# Patient Record
Sex: FEMALE | Race: WHITE | Employment: OTHER | URBAN - METROPOLITAN AREA
[De-identification: names, ages, dates, MRNs, and addresses within clinical notes are randomized per-mention and may not be internally consistent; named-entity substitution may affect disease eponyms.]

---

## 2018-02-20 ENCOUNTER — APPOINTMENT (OUTPATIENT)
Dept: CT IMAGING | Facility: CLINIC | Age: 68
DRG: 394 | End: 2018-02-20
Attending: NURSE PRACTITIONER
Payer: MEDICARE

## 2018-02-20 ENCOUNTER — HOSPITAL ENCOUNTER (INPATIENT)
Facility: CLINIC | Age: 68
LOS: 8 days | Discharge: HOME | DRG: 394 | End: 2018-02-28
Attending: NURSE PRACTITIONER | Admitting: INTERNAL MEDICINE
Payer: MEDICARE

## 2018-02-20 DIAGNOSIS — K92.2 GASTROINTESTINAL HEMORRHAGE, UNSPECIFIED GASTROINTESTINAL HEMORRHAGE TYPE: ICD-10-CM

## 2018-02-20 DIAGNOSIS — A41.9 SEPTIC SHOCK (H): ICD-10-CM

## 2018-02-20 DIAGNOSIS — R65.21 SEPTIC SHOCK (H): ICD-10-CM

## 2018-02-20 DIAGNOSIS — R19.7 DIARRHEA, UNSPECIFIED TYPE: ICD-10-CM

## 2018-02-20 LAB
ALBUMIN SERPL-MCNC: 3.4 G/DL (ref 3.4–5)
ALBUMIN UR-MCNC: NEGATIVE MG/DL
ALP SERPL-CCNC: 64 U/L (ref 40–150)
ALT SERPL W P-5'-P-CCNC: 19 U/L (ref 0–50)
ANION GAP SERPL CALCULATED.3IONS-SCNC: 11 MMOL/L (ref 3–14)
APPEARANCE UR: CLEAR
AST SERPL W P-5'-P-CCNC: 13 U/L (ref 0–45)
BILIRUB SERPL-MCNC: 0.4 MG/DL (ref 0.2–1.3)
BILIRUB UR QL STRIP: NEGATIVE
BUN SERPL-MCNC: 56 MG/DL (ref 7–30)
CALCIUM SERPL-MCNC: 9.3 MG/DL (ref 8.5–10.1)
CHLORIDE SERPL-SCNC: 109 MMOL/L (ref 94–109)
CO2 SERPL-SCNC: 20 MMOL/L (ref 20–32)
COLOR UR AUTO: YELLOW
CREAT SERPL-MCNC: 0.78 MG/DL (ref 0.52–1.04)
ERYTHROCYTE [DISTWIDTH] IN BLOOD BY AUTOMATED COUNT: 19.6 % (ref 10–15)
GFR SERPL CREATININE-BSD FRML MDRD: 73 ML/MIN/1.7M2
GLUCOSE SERPL-MCNC: 206 MG/DL (ref 70–99)
GLUCOSE UR STRIP-MCNC: NEGATIVE MG/DL
HCT VFR BLD AUTO: 38 % (ref 35–47)
HEMOCCULT STL QL: POSITIVE
HGB BLD-MCNC: 11.3 G/DL (ref 11.7–15.7)
HGB BLD-MCNC: 8.7 G/DL (ref 11.7–15.7)
HGB UR QL STRIP: NEGATIVE
KETONES UR STRIP-MCNC: NEGATIVE MG/DL
LACTATE SERPL-SCNC: 2 MMOL/L (ref 0.4–2)
LACTATE SERPL-SCNC: 4.2 MMOL/L (ref 0.4–2)
LEUKOCYTE ESTERASE UR QL STRIP: NEGATIVE
MCH RBC QN AUTO: 25.2 PG (ref 26.5–33)
MCHC RBC AUTO-ENTMCNC: 29.7 G/DL (ref 31.5–36.5)
MCV RBC AUTO: 85 FL (ref 78–100)
NITRATE UR QL: NEGATIVE
PH UR STRIP: 5 PH (ref 5–7)
PLATELET # BLD AUTO: 280 10E9/L (ref 150–450)
POTASSIUM SERPL-SCNC: 4.9 MMOL/L (ref 3.4–5.3)
PROT SERPL-MCNC: 7.3 G/DL (ref 6.8–8.8)
RBC # BLD AUTO: 4.48 10E12/L (ref 3.8–5.2)
SODIUM SERPL-SCNC: 140 MMOL/L (ref 133–144)
SOURCE: NORMAL
SP GR UR STRIP: 1.02 (ref 1–1.03)
UROBILINOGEN UR STRIP-MCNC: 0 MG/DL (ref 0–2)
WBC # BLD AUTO: 20 10E9/L (ref 4–11)

## 2018-02-20 PROCEDURE — 82272 OCCULT BLD FECES 1-3 TESTS: CPT | Performed by: NURSE PRACTITIONER

## 2018-02-20 PROCEDURE — 25000128 H RX IP 250 OP 636: Performed by: NURSE PRACTITIONER

## 2018-02-20 PROCEDURE — 99207 ZZC CDG-HISTORY COMP: MEETS EXP. PROB FOCUSED- DOWN CODED LACK OF PFSH: CPT | Performed by: INTERNAL MEDICINE

## 2018-02-20 PROCEDURE — 51702 INSERT TEMP BLADDER CATH: CPT

## 2018-02-20 PROCEDURE — 83605 ASSAY OF LACTIC ACID: CPT | Performed by: NURSE PRACTITIONER

## 2018-02-20 PROCEDURE — 25000125 ZZHC RX 250: Performed by: INTERNAL MEDICINE

## 2018-02-20 PROCEDURE — 12000000 ZZH R&B MED SURG/OB

## 2018-02-20 PROCEDURE — 96375 TX/PRO/DX INJ NEW DRUG ADDON: CPT

## 2018-02-20 PROCEDURE — 96365 THER/PROPH/DIAG IV INF INIT: CPT

## 2018-02-20 PROCEDURE — 36415 COLL VENOUS BLD VENIPUNCTURE: CPT | Performed by: NURSE PRACTITIONER

## 2018-02-20 PROCEDURE — 85027 COMPLETE CBC AUTOMATED: CPT | Performed by: NURSE PRACTITIONER

## 2018-02-20 PROCEDURE — 85018 HEMOGLOBIN: CPT | Performed by: INTERNAL MEDICINE

## 2018-02-20 PROCEDURE — 36415 COLL VENOUS BLD VENIPUNCTURE: CPT | Performed by: INTERNAL MEDICINE

## 2018-02-20 PROCEDURE — 87040 BLOOD CULTURE FOR BACTERIA: CPT | Performed by: NURSE PRACTITIONER

## 2018-02-20 PROCEDURE — 80053 COMPREHEN METABOLIC PANEL: CPT | Performed by: NURSE PRACTITIONER

## 2018-02-20 PROCEDURE — 96376 TX/PRO/DX INJ SAME DRUG ADON: CPT

## 2018-02-20 PROCEDURE — A9270 NON-COVERED ITEM OR SERVICE: HCPCS | Mod: GY | Performed by: INTERNAL MEDICINE

## 2018-02-20 PROCEDURE — 99221 1ST HOSP IP/OBS SF/LOW 40: CPT | Mod: AI | Performed by: INTERNAL MEDICINE

## 2018-02-20 PROCEDURE — 25000128 H RX IP 250 OP 636

## 2018-02-20 PROCEDURE — 25000132 ZZH RX MED GY IP 250 OP 250 PS 637: Mod: GY | Performed by: INTERNAL MEDICINE

## 2018-02-20 PROCEDURE — 74177 CT ABD & PELVIS W/CONTRAST: CPT

## 2018-02-20 PROCEDURE — 99285 EMERGENCY DEPT VISIT HI MDM: CPT | Mod: 25

## 2018-02-20 PROCEDURE — 96361 HYDRATE IV INFUSION ADD-ON: CPT

## 2018-02-20 PROCEDURE — 81003 URINALYSIS AUTO W/O SCOPE: CPT | Performed by: NURSE PRACTITIONER

## 2018-02-20 PROCEDURE — 25000128 H RX IP 250 OP 636: Performed by: INTERNAL MEDICINE

## 2018-02-20 RX ORDER — PROCHLORPERAZINE MALEATE 5 MG
5 TABLET ORAL EVERY 6 HOURS PRN
Status: DISCONTINUED | OUTPATIENT
Start: 2018-02-20 | End: 2018-02-28 | Stop reason: HOSPADM

## 2018-02-20 RX ORDER — SODIUM CHLORIDE 9 MG/ML
INJECTION, SOLUTION INTRAVENOUS CONTINUOUS
Status: DISCONTINUED | OUTPATIENT
Start: 2018-02-20 | End: 2018-02-25

## 2018-02-20 RX ORDER — CEFTRIAXONE SODIUM 1 G/50ML
1 INJECTION, SOLUTION INTRAVENOUS EVERY 24 HOURS
Status: DISCONTINUED | OUTPATIENT
Start: 2018-02-21 | End: 2018-02-21

## 2018-02-20 RX ORDER — LEVOTHYROXINE SODIUM 100 UG/1
100 TABLET ORAL DAILY
Status: DISCONTINUED | OUTPATIENT
Start: 2018-02-20 | End: 2018-02-28 | Stop reason: HOSPADM

## 2018-02-20 RX ORDER — SIMVASTATIN 40 MG
40 TABLET ORAL AT BEDTIME
Status: DISCONTINUED | OUTPATIENT
Start: 2018-02-20 | End: 2018-02-28 | Stop reason: HOSPADM

## 2018-02-20 RX ORDER — ANASTROZOLE 1 MG/1
1 TABLET ORAL DAILY
Status: DISCONTINUED | OUTPATIENT
Start: 2018-02-20 | End: 2018-02-28 | Stop reason: HOSPADM

## 2018-02-20 RX ORDER — CHOLECALCIFEROL (VITAMIN D3) 25 MCG
2000 CAPSULE ORAL DAILY
COMMUNITY

## 2018-02-20 RX ORDER — CITALOPRAM HYDROBROMIDE 20 MG/1
20 TABLET ORAL DAILY
Status: DISCONTINUED | OUTPATIENT
Start: 2018-02-20 | End: 2018-02-28 | Stop reason: HOSPADM

## 2018-02-20 RX ORDER — IOPAMIDOL 755 MG/ML
500 INJECTION, SOLUTION INTRAVASCULAR ONCE
Status: COMPLETED | OUTPATIENT
Start: 2018-02-20 | End: 2018-02-20

## 2018-02-20 RX ORDER — ONDANSETRON 2 MG/ML
4 INJECTION INTRAMUSCULAR; INTRAVENOUS EVERY 30 MIN PRN
Status: DISCONTINUED | OUTPATIENT
Start: 2018-02-20 | End: 2018-02-21

## 2018-02-20 RX ORDER — ONDANSETRON 2 MG/ML
4 INJECTION INTRAMUSCULAR; INTRAVENOUS EVERY 6 HOURS PRN
Status: DISCONTINUED | OUTPATIENT
Start: 2018-02-20 | End: 2018-02-28 | Stop reason: HOSPADM

## 2018-02-20 RX ORDER — NALOXONE HYDROCHLORIDE 0.4 MG/ML
.1-.4 INJECTION, SOLUTION INTRAMUSCULAR; INTRAVENOUS; SUBCUTANEOUS
Status: DISCONTINUED | OUTPATIENT
Start: 2018-02-20 | End: 2018-02-28 | Stop reason: HOSPADM

## 2018-02-20 RX ORDER — ONDANSETRON 4 MG/1
4 TABLET, ORALLY DISINTEGRATING ORAL EVERY 6 HOURS PRN
Status: DISCONTINUED | OUTPATIENT
Start: 2018-02-20 | End: 2018-02-28 | Stop reason: HOSPADM

## 2018-02-20 RX ORDER — CEFTRIAXONE SODIUM 1 G/50ML
1 INJECTION, SOLUTION INTRAVENOUS ONCE
Status: COMPLETED | OUTPATIENT
Start: 2018-02-20 | End: 2018-02-20

## 2018-02-20 RX ORDER — ANASTROZOLE 1 MG/1
1 TABLET ORAL DAILY
COMMUNITY

## 2018-02-20 RX ORDER — POTASSIUM CHLORIDE 1500 MG/1
20-40 TABLET, EXTENDED RELEASE ORAL
Status: DISCONTINUED | OUTPATIENT
Start: 2018-02-20 | End: 2018-02-28 | Stop reason: HOSPADM

## 2018-02-20 RX ORDER — ONDANSETRON 2 MG/ML
INJECTION INTRAMUSCULAR; INTRAVENOUS
Status: COMPLETED
Start: 2018-02-20 | End: 2018-02-20

## 2018-02-20 RX ORDER — PROCHLORPERAZINE 25 MG
12.5 SUPPOSITORY, RECTAL RECTAL EVERY 12 HOURS PRN
Status: DISCONTINUED | OUTPATIENT
Start: 2018-02-20 | End: 2018-02-28 | Stop reason: HOSPADM

## 2018-02-20 RX ORDER — POTASSIUM CHLORIDE 1.5 G/1.58G
20-40 POWDER, FOR SOLUTION ORAL
Status: DISCONTINUED | OUTPATIENT
Start: 2018-02-20 | End: 2018-02-28 | Stop reason: HOSPADM

## 2018-02-20 RX ORDER — ACETAMINOPHEN 650 MG/1
650 SUPPOSITORY RECTAL EVERY 4 HOURS PRN
Status: DISCONTINUED | OUTPATIENT
Start: 2018-02-20 | End: 2018-02-23

## 2018-02-20 RX ORDER — ACETAMINOPHEN 325 MG/1
650 TABLET ORAL EVERY 4 HOURS PRN
Status: DISCONTINUED | OUTPATIENT
Start: 2018-02-20 | End: 2018-02-28 | Stop reason: HOSPADM

## 2018-02-20 RX ORDER — CHLORAL HYDRATE 500 MG
1 CAPSULE ORAL DAILY
COMMUNITY

## 2018-02-20 RX ORDER — POTASSIUM CHLORIDE 7.45 MG/ML
10 INJECTION INTRAVENOUS
Status: DISCONTINUED | OUTPATIENT
Start: 2018-02-20 | End: 2018-02-28 | Stop reason: HOSPADM

## 2018-02-20 RX ORDER — POTASSIUM CHLORIDE 29.8 MG/ML
20 INJECTION INTRAVENOUS
Status: DISCONTINUED | OUTPATIENT
Start: 2018-02-20 | End: 2018-02-28 | Stop reason: HOSPADM

## 2018-02-20 RX ORDER — POTASSIUM CL/LIDO/0.9 % NACL 10MEQ/0.1L
10 INTRAVENOUS SOLUTION, PIGGYBACK (ML) INTRAVENOUS
Status: DISCONTINUED | OUTPATIENT
Start: 2018-02-20 | End: 2018-02-28 | Stop reason: HOSPADM

## 2018-02-20 RX ADMIN — LEVOTHYROXINE SODIUM 100 MCG: 100 TABLET ORAL at 18:16

## 2018-02-20 RX ADMIN — SIMVASTATIN 40 MG: 40 TABLET, FILM COATED ORAL at 21:05

## 2018-02-20 RX ADMIN — SODIUM CHLORIDE 1000 ML: 9 INJECTION, SOLUTION INTRAVENOUS at 11:16

## 2018-02-20 RX ADMIN — IOPAMIDOL 100 ML: 755 INJECTION, SOLUTION INTRAVENOUS at 14:21

## 2018-02-20 RX ADMIN — CITALOPRAM HYDROBROMIDE 20 MG: 20 TABLET ORAL at 18:15

## 2018-02-20 RX ADMIN — SODIUM CHLORIDE 1000 ML: 9 INJECTION, SOLUTION INTRAVENOUS at 14:21

## 2018-02-20 RX ADMIN — SODIUM CHLORIDE: 9 INJECTION, SOLUTION INTRAVENOUS at 17:47

## 2018-02-20 RX ADMIN — CEFTRIAXONE SODIUM 1 G: 1 INJECTION, SOLUTION INTRAVENOUS at 12:19

## 2018-02-20 RX ADMIN — ONDANSETRON 4 MG: 2 INJECTION INTRAMUSCULAR; INTRAVENOUS at 12:18

## 2018-02-20 RX ADMIN — CEFTRIAXONE SODIUM 1 G: 1 INJECTION, SOLUTION INTRAVENOUS at 15:55

## 2018-02-20 RX ADMIN — ONDANSETRON 4 MG: 4 TABLET, ORALLY DISINTEGRATING ORAL at 21:11

## 2018-02-20 RX ADMIN — PANTOPRAZOLE SODIUM 40 MG: 40 INJECTION, POWDER, FOR SOLUTION INTRAVENOUS at 18:12

## 2018-02-20 RX ADMIN — ACETAMINOPHEN 650 MG: 325 TABLET, FILM COATED ORAL at 18:16

## 2018-02-20 RX ADMIN — METRONIDAZOLE 500 MG: 500 INJECTION, SOLUTION INTRAVENOUS at 18:18

## 2018-02-20 RX ADMIN — ANASTROZOLE 1 MG: 1 TABLET, COATED ORAL at 18:16

## 2018-02-20 RX ADMIN — SODIUM CHLORIDE, POTASSIUM CHLORIDE, SODIUM LACTATE AND CALCIUM CHLORIDE 2000 ML: 600; 310; 30; 20 INJECTION, SOLUTION INTRAVENOUS at 11:56

## 2018-02-20 RX ADMIN — ONDANSETRON 4 MG: 2 INJECTION INTRAMUSCULAR; INTRAVENOUS at 16:54

## 2018-02-20 ASSESSMENT — ENCOUNTER SYMPTOMS
VOMITING: 1
NAUSEA: 1
DIFFICULTY URINATING: 0
DIZZINESS: 1
APPETITE CHANGE: 0
DYSURIA: 0
WOUND: 0
DIAPHORESIS: 1
CHILLS: 0
FATIGUE: 0
HEADACHES: 0
FEVER: 0
DIARRHEA: 1
HEMATURIA: 0
COUGH: 0
SHORTNESS OF BREATH: 0
ABDOMINAL PAIN: 0
LIGHT-HEADEDNESS: 0

## 2018-02-20 ASSESSMENT — ACTIVITIES OF DAILY LIVING (ADL): ADLS_ACUITY_SCORE: 11

## 2018-02-20 ASSESSMENT — PAIN DESCRIPTION - DESCRIPTORS: DESCRIPTORS: CONSTANT

## 2018-02-20 NOTE — ED NOTES
Bed: ED28  Expected date: 2/20/18  Expected time: 10:39 AM  Means of arrival: Ambulance  Comments:  HE  66yo Nausea/vomiting

## 2018-02-20 NOTE — H&P
[facility]    History and Physical  Hospitalist       Date of Admission:  2/20/2018  Date of Service (when I saw the patient): 02/20/18    Assessment & Plan   Michelle Pedersen is a 67 year old female who presents with 1-2 days of diarrhea.  Stools turned black earlier today.  Patient dehydrated upon presentation with elevated lactate has improved with IV fluid resuscitation.  There is no evidence for infection however CT scan did suggest possible mild inflammatory change of the transverse and upper descending colon.      1.  Suspect upper gastrointestinal bleeding with presentation of melena:    Will admit to inpatient for closer observation and monitoring    Start Protonix 40 mg IV q.12 hours    IV fluids    GI consultation for consideration of EGD.    Serial hemoglobins every 6 hours.  Conditional transfusion 1 unit for hemoglobin 7 or less.    Continue Flagyl and Rocephin empirically while stool studies are pending.    2.  Hypothyroidism    Resume Synthroid    3.  History of breast cancer    Resume Arimidex    4.  Depression    Resume antidepressant medication.    DVT Prophylaxis: Pneumatic Compression Devices  Code Status: Full Code    Disposition: Expected discharge in TBD    Darrion Moore MD    Primary Care Physician   Vidya Serna    Chief Complaint   1-2 days of diarrhea.    History is obtained from the patient, electronic health record and emergency department physician    History of Present Illness   Michlele Pedersen is a 67 year old female with a history of breast cancer depression hyperlipidemia and hypertension as well as a GI bleed due to duodenal ulcer who now presents with diarrhea.  Patient states her symptoms began yesterday with liquid watery stools.  She noticed that today the stools began to become dark and then black.  Very lightheaded and woozy but did not lose consciousness or pass out.  She has had no shortness of breath fevers sweats chills cough.  She's had no vomiting of blood  or coffee-ground material.  She denies any abdominal pain.  She's had some nausea but no vomiting.  She had a history of duodenal ulcer with a GI bleed with blood transfusion.  This was back in 2011.  At that time it was thought secondary to taking Aleve.  She is no longer taking Aleve or any other nonsteroidal anti-inflammatories including aspirin.    She is not taking a PPI.  She currently feels better after receiving IV fluids.  Of note patient had transient hypotension with systolic pressure in the 80s in the field but henceforth blood pressures have been maintained at 120 systolic or greater.    Past Medical History    I have reviewed this patient's medical history and updated it with pertinent information if needed.   Past Medical History:   Diagnosis Date     Breast cancer (H)      Depression      High cholesterol      Hypothyroid        Past Surgical History   I have reviewed this patient's surgical history and updated it with pertinent information if needed.  Past Surgical History:   Procedure Laterality Date     ESOPHAGOSCOPY, GASTROSCOPY, DUODENOSCOPY (EGD), COMBINED  10/27/2011    Procedure:COMBINED ESOPHAGOSCOPY, GASTROSCOPY, DUODENOSCOPY (EGD), BIOPSY SINGLE OR MULTIPLE; Esophagoscopy, Gastroscopy, Duodenoscopy cold biopsy ; Surgeon:NADIA MORALES; Location: GI       Prior to Admission Medications   Prior to Admission Medications   Prescriptions Last Dose Informant Patient Reported? Taking?   Cholecalciferol (VITAMIN D-3) 1000 UNITS CAPS 2/19/2018 at am  Yes Yes   Sig: Take 2,000 Units by mouth daily   Glucosamine-Chondroitin 250-200 MG TABS 2/19/2018 at am  Yes Yes   Sig: Take 1 tablet by mouth daily   anastrozole (ARIMIDEX) 1 MG tablet 2/19/2018 at am  Yes Yes   Sig: Take 1 mg by mouth daily   calcium-vitamin D (CALTRATE) 600-400 MG-UNIT per tablet 2/19/2018 at am  Yes Yes   Sig: Take 1 tablet by mouth daily   citalopram (CELEXA) 20 MG tablet 2/19/2018 at am  Yes Yes   Sig: Take 20 mg by mouth  daily.   fish oil-omega-3 fatty acids 1000 MG capsule 2/19/2018 at am  Yes Yes   Sig: Take 1 g by mouth daily   levothyroxine (SSYNTHROID,LEVOTHROID) 100 MCG tablet 2/19/2018 at am  Yes Yes   Sig: Take 100 mcg by mouth daily.   melatonin 5 MG tablet   Yes Yes   Sig: Take 5 mg by mouth nightly as needed for sleep   simvastatin (ZOCOR) 20 MG tablet 2/19/2018 at Unknown time  Yes Yes   Sig: Take 40 mg by mouth At Bedtime       Facility-Administered Medications: None     Allergies   Allergies   Allergen Reactions     Penicillins Itching and Rash     Tolerated ceftriaxone         Social History   I have reviewed this patient's social history and updated it with pertinent information if needed. Michelle Pedersen  reports that she has never smoked. She has never used smokeless tobacco. She reports that she drinks alcohol. She reports that she does not use illicit drugs.    Family History   I have reviewed this patient's family history and updated it with pertinent information if needed.   No family history on file.    Review of Systems   The 10 point Review of Systems is negative other than noted in the HPI or here.      Physical Exam   Temp: 99.2  F (37.3  C) Temp src: Oral BP: 130/60 Pulse: 114 Heart Rate: 115 Resp: 18 SpO2: 98 % O2 Device: None (Room air)    Vital Signs with Ranges  Temp:  [97.6  F (36.4  C)-99.2  F (37.3  C)] 99.2  F (37.3  C)  Pulse:  [103-115] 114  Heart Rate:  [115] 115  Resp:  [18-20] 18  BP: (115-211)/() 130/60  SpO2:  [93 %-100 %] 98 %  220 lbs 0 oz    Constitutional: Awake, alert, cooperative, no apparent distress.  Eyes: Conjunctiva and pupils examined and normal.  HEENT: Tacky mucous membranes, normal dentition.  Respiratory: Clear to auscultation bilaterally, no crackles or wheezing.  Cardiovascular: Tachycardic in the 110s with regular rhythm, normal S1 and S2, and no murmur noted.  GI: Soft, non-distended, non-tender, normal bowel sounds.  Skin: No rashes, no cyanosis, no  edema.  Musculoskeletal: No joint swelling, erythema or tenderness.  Neurologic: Cranial nerves 2-12 intact, normal strength and sensation.  Psychiatric: Alert, oriented to person, place and time, no obvious anxiety or depression.    Data   Data reviewed today:  I personally reviewed no images or EKG's today.    Recent Labs  Lab 02/20/18  1116   WBC 20.0*   HGB 11.3*   MCV 85         POTASSIUM 4.9   CHLORIDE 109   CO2 20   BUN 56*   CR 0.78   ANIONGAP 11   VIRI 9.3   *   ALBUMIN 3.4   PROTTOTAL 7.3   BILITOTAL 0.4   ALKPHOS 64   ALT 19   AST 13       Imaging:  Recent Results (from the past 24 hour(s))   CT Abdomen Pelvis w Contrast    Narrative    CT ABDOMEN/PELVIS WITH CONTRAST February 20, 2018 2:28 PM     HISTORY: GI bleed, elevated WBC.    TECHNIQUE: Volumetric acquisition through abdomen and pelvis with  IV  contrast.  100mL Isovue-370. Radiation dose for this scan was reduced  using automated exposure control, adjustment of the mA and/or kV  according to patient size, or iterative reconstruction technique.    COMPARISON: 10/26/2011.    FINDINGS: Coronary artery calcifications. The liver, gallbladder,  spleen, pancreas, adrenal glands and kidneys demonstrate no worrisome  findings. There are a few small and borderline prominent upper  abdominal lymph nodes and a left retroperitoneal lymph node near the  adrenal gland which are stable. Mild vascular calcification. No  abdominal aortic aneurysm.    Mild wall thickening of the transverse and upper descending colon  suggesting colitis. There was similar mild wall thickening involving  the colon on the previous exam. No pneumatosis, bowel obstruction or  free air.    Uterus is present. No suspicious pelvic masses. Yañez catheter in the  bladder.      Impression    IMPRESSION: Mild nonspecific left-sided colitis.       AKASH HERNANDEZ MD

## 2018-02-20 NOTE — IP AVS SNAPSHOT
[place] [address] [facility]    [address]    [facility] [address]    Phone:  [phone]    Fax:  [phone]                                       After Visit Summary   2/20/2018    Michelle Pedersen    MRN: [id number]           After Visit Summary Signature Page     I have received my discharge instructions, and my questions have been answered. I have discussed any challenges I see with this plan with the nurse or doctor.    ..........................................................................................................................................  Patient/Patient Representative Signature      ..........................................................................................................................................  Patient Representative Print Name and Relationship to Patient    ..................................................               ................................................  Date                                            Time    ..........................................................................................................................................  Reviewed by Signature/Title    ...................................................              ..............................................  Date                                                            Time

## 2018-02-20 NOTE — IP AVS SNAPSHOT
MRN:[id number]                      After Visit Summary   2/20/2018    Michelle Pedersen    MRN: [id number]           Thank you!     Thank you for choosing [facility] for your care. Our goal is always to provide you with excellent care. Hearing back from our patients is one way we can continue to improve our services. Please take a few minutes to complete the written survey that you may receive in the mail after you visit. If you would like to speak to someone directly about your visit please contact Patient Relations at [phone]. Thank you!          Patient Information     Date Of Birth          1950        Designated Caregiver       Most Recent Value    Caregiver    Will someone help with your care after discharge? no      About your hospital stay     You were admitted on:  February 20, 2018 You last received care in the:  [place] [address] [facility]    You were discharged on:  February 28, 2018       Who to Call     For medical emergencies, please call 911.  For non-urgent questions about your medical care, please call your primary care provider or clinic, [phone]  For questions related to your surgery, please call your surgery clinic        Attending Provider     Provider Specialty    Max De Los Santos APRN [facility]    Darrion Moore MD Internal Medicine       Primary Care Provider Office Phone # Fax #    Vidya Serna [phone]      After Care Instructions     Activity       Your activity upon discharge: activity as tolerated            Diet       Follow this diet upon discharge: Regular                  Follow-up Appointments     Follow-up and recommended labs and tests        Follow up with primary care provider, Vidya Serna, within 7 days for hospital follow- up.  The following labs/tests are recommended: CBC and BMP in 7 days.                  Pending Results     No orders found from 2/18/2018 to 2/21/2018.           "  Statement of Approval     Ordered          18 1149  I have reviewed and agree with all the recommendations and orders detailed in this document.  EFFECTIVE NOW     Approved and electronically signed by:  Sam Palomares DO             Admission Information     Date & Time Department Dept. Phone    [address] [place] [address] [facility] [phone]      Your Vitals Were     Blood Pressure Pulse Temperature Respirations Weight Pulse Oximetry    116/53 (BP Location: Left arm) 100 98.6  F (37  C) (Oral) 20 111.2 kg (245 lb 1.6 oz) 94%    BMI (Body Mass Index)                   44.83 kg/m2           MyChart Information     [employer] lets you send messages to your doctor, view your test results, renew your prescriptions, schedule appointments and more. To sign up, go to [email or web address] . Click on \"Log in\" on the left side of the screen, which will take you to the Welcome page. Then click on \"Sign up Now\" on the right side of the page.     You will be asked to enter the access code listed below, as well as some personal information. Please follow the directions to create your username and password.     Your access code is: [email or web address]  Expires: 2018 12:19 PM     Your access code will  in 90 days. If you need help or a new code, please call your [place] clinic or [phone].        Care EveryWhere ID     This is your Care EveryWhere ID. This could be used by other organizations to access your [place] medical records  [id number]        Equal Access to Services     [facility] LEXX : Hadii aad ku hadasho Soomaali, waaxda luqadaha, qaybta kaalmada adeegyada, eric ayala . So [facility] [phone].    ATENCIÓN: Si habla español, tiene a ascencio disposición servicios gratuitos de asistencia lingüística. Llame al [phone].    We comply with applicable federal civil rights laws and [place] laws. We do not discriminate on the basis of race, color, national origin, " age, disability, sex, sexual orientation, or gender identity.               Review of your medicines      START taking        Dose / Directions    pantoprazole 40 MG EC tablet   Commonly known as:  PROTONIX   Used for:  Gastrointestinal hemorrhage, unspecified gastrointestinal hemorrhage type        Dose:  40 mg   Take 1 tablet (40 mg) by mouth daily   Quantity:  30 tablet   Refills:  0         CONTINUE these medicines which have NOT CHANGED        Dose / Directions    anastrozole 1 MG tablet   Commonly known as:  ARIMIDEX        Dose:  1 mg   Take 1 mg by mouth daily   Refills:  0       calcium-vitamin D 600-400 MG-UNIT per tablet   Commonly known as:  CALTRATE        Dose:  1 tablet   Take 1 tablet by mouth daily   Refills:  0       citalopram 20 MG tablet   Commonly known as:  celeXA        Dose:  20 mg   Take 20 mg by mouth daily.   Refills:  0       fish oil-omega-3 fatty acids 1000 MG capsule        Dose:  1 g   Take 1 g by mouth daily   Refills:  0       Glucosamine-Chondroitin 250-200 MG Tabs        Dose:  1 tablet   Take 1 tablet by mouth daily   Refills:  0       levothyroxine 100 MCG tablet   Commonly known as:  SYNTHROID/LEVOTHROID        Dose:  100 mcg   Take 100 mcg by mouth daily.   Refills:  0       melatonin 5 MG tablet        Dose:  5 mg   Take 5 mg by mouth nightly as needed for sleep   Refills:  0       simvastatin 20 MG tablet   Commonly known as:  ZOCOR        Dose:  40 mg   Take 40 mg by mouth At Bedtime   Refills:  0       Vitamin D-3 1000 UNITS Caps        Dose:  2000 Units   Take 2,000 Units by mouth daily   Refills:  0            Where to get your medicines      These medications were sent to [place] [address] [facility] - [place], [place] - [address]  [address] [address]     Phone:  [phone]     pantoprazole 40 MG EC tablet                Protect others around you: Learn how to safely use, store and throw away your medicines at www.disposemymeds.org.             Medication  List: This is a list of all your medications and when to take them. Check marks below indicate your daily home schedule. Keep this list as a reference.      Medications           Morning Afternoon Evening Bedtime As Needed    anastrozole 1 MG tablet   Commonly known as:  ARIMIDEX   Take 1 mg by mouth daily   Last time this was given:  1 mg on 2/28/2018  8:12 AM                                   calcium-vitamin D 600-400 MG-UNIT per tablet   Commonly known as:  CALTRATE   Take 1 tablet by mouth daily                                   citalopram 20 MG tablet   Commonly known as:  celeXA   Take 20 mg by mouth daily.   Last time this was given:  20 mg on 2/28/2018  8:13 AM                                   fish oil-omega-3 fatty acids 1000 MG capsule   Take 1 g by mouth daily                                   Glucosamine-Chondroitin 250-200 MG Tabs   Take 1 tablet by mouth daily                                   levothyroxine 100 MCG tablet   Commonly known as:  SYNTHROID/LEVOTHROID   Take 100 mcg by mouth daily.   Last time this was given:  100 mcg on 2/28/2018  8:12 AM                                   melatonin 5 MG tablet   Take 5 mg by mouth nightly as needed for sleep                        As Needed           pantoprazole 40 MG EC tablet   Commonly known as:  PROTONIX   Take 1 tablet (40 mg) by mouth daily   Last time this was given:  40 mg on 2/28/2018  6:52 AM                                   simvastatin 20 MG tablet   Commonly known as:  ZOCOR   Take 40 mg by mouth At Bedtime   Last time this was given:  40 mg on 2/27/2018  9:14 PM                                   Vitamin D-3 1000 UNITS Caps   Take 2,000 Units by mouth daily                                             More Information        Ischemic Colitis     Ischemic colitis happens if blood flow to a part of the colon is reduced.   Ischemic colitis happens when blood flow to the colon is reduced or blocked. Bloody diarrhea and severe belly pain are the  most common symptoms. Other symptoms include vomiting, fever, and fainting. Diarrhea can lead to severe dehydration. This is the rapid loss of the fluids your body needs to function. Because of the severe pain and the risk for dehydration, ischemic colitis should be treated right away.  Causes of ischemic colitis  The cause of the reduction or blockage of blood flow to the colon is not well understood. In some cases, a sudden drop in blood pressure, or dehydration, leads to an episode. Ischemic colitis is more likely in people with blood clotting problems or heart and blood vessel disease.  Diagnosing ischemic colitis  The presence of severe belly pain and bloody diarrhea is often enough to diagnose ischemic colitis. After these symptoms are treated, a test called a colonoscopy will likely be done. This helps rule out other colon problems. The test uses a thin, flexible scope with a light and camera on the end. The scope is inserted through the rectum into the colon. The scope sends pictures from inside the colon to a video screen. A small sample of tissue (biopsy) from the colon may be taken for further testing in a lab.  Treating ischemic colitis  Episodes are treated in the hospital. You may remain in the hospital for several days or longer:    An IV line is put into a vein in your hand or arm. You will be given fluids through the IV to treat dehydration. You are also given IV pain medicines if you need them.    You may be given IV antibiotics (medicines that treat infection).    To rest the bowel, you will not eat or drink for a few days. In rare cases, when symptoms are very severe, you will be given nutrition through the IV.    If you lost a lot of blood during the episode, you may receive a blood transfusion.    In rare cases, an episode causes severe damage to the colon. In this case, surgery may need to be done to remove the damaged section. Your healthcare provider can tell you more if this is  needed.  Follow-up  While you are being treated, your healthcare provider will work to find the cause of your ischemic colitis. After you recover, you may need to make lifestyle changes, such as quitting smoking, or take medicines to decrease your risk of another episode. Call 911 or emergency services or go to the emergency room right away if your symptoms return.  Date Last Reviewed: 7/1/2016 2000-2017 [employer]. [address] [place] [address], [place] [address]. All rights reserved. This information is not intended as a substitute for professional medical care. Always follow your healthcare professional's instructions.                Lower GI Bleeding (Stable)  You have signs of blood in your stool. This is called rectal bleeding. The bleeding may have begun in another part of your gastrointestinal (GI) tract. If the blood is bright red, it is likely coming from the lower part of the GI tract. If the blood is black or dark, it might be coming from higher up in the GI tract. Very small amounts of GI bleeding may not be visible and can only be discovered during a test on your stool. Possible causes of lower GI bleeding include:    Hemorrhoids    Anal fissures    Diverticulitis    Inflammatory bowel disease (Crohn's disease or ulcerative colitis)    Polyps (growths) in the intestine  Note: Iron supplements and medicines for diarrhea or upset stomach can cause black stools. Foods such as licorice and red beets can also discolor the stool and be mistaken for bleeding. These are not bleeding and are not a cause for alarm.  Home care  You have not lost a large amount of blood and your condition appears stable at this time. You may resume normal activity as long as you feel well.  Avoid NSAIDs, such as aspirin, ibuprofen, or naproxen. They can irritate the stomach and cause further bleeding. If you are taking these medicines for other medical reasons, talk to your healthcare provider before you stop  them.   Follow-up care  Follow up with your healthcare provider as advised. Further tests may be needed to find the cause of your bleeding.  When to seek medical advice  Call your healthcare provider for any of the following:    Large amount of rectal bleeding     Increasing abdominal pain    Weakness, dizziness  Call 911  Get emergency medical care if any of the following occur:    Loss of consciousness    Vomiting blood  Date Last Reviewed: 6/24/2015 2000-2017 [employer]. [address] [place] [address], [place] [id number]. All rights reserved. This information is not intended as a substitute for professional medical care. Always follow your healthcare professional's instructions.                Pantoprazole tablets  Brand Name: Protonix  What is this medicine?  PANTOPRAZOLE (pan TOE pra zole) prevents the production of acid in the stomach. It is used to treat gastroesophageal reflux disease (GERD), inflammation of the esophagus, and Zollinger-Voss syndrome.  How should I use this medicine?  Take this medicine by mouth. Swallow the tablets whole with a drink of water. Follow the directions on the prescription label. Do not crush, break, or chew. Take your medicine at regular intervals. Do not take your medicine more often than directed.  Talk to your pediatrician regarding the use of this medicine in children. While this drug may be prescribed for children as young as 5 years for selected conditions, precautions do apply.  What side effects may I notice from receiving this medicine?  Side effects that you should report to your doctor or health care professional as soon as possible:    allergic reactions like skin rash, itching or hives, swelling of the face, lips, or tongue    bone, muscle or joint pain    breathing problems    chest pain or chest tightness    dark yellow or brown urine    dizziness    fast, irregular heartbeat    feeling faint or lightheaded    fever or sore throat    muscle  spasm    palpitations    rash on cheeks or arms that gets worse in the sun    redness, blistering, peeling or loosening of the skin, including inside the mouth    seizures    tremors    unusual bleeding or bruising    unusually weak or tired    yellowing of the eyes or skin  Side effects that usually do not require medical attention (report to your doctor or health care professional if they continue or are bothersome):    constipation    diarrhea    dry mouth    headache    nausea  What may interact with this medicine?  Do not take this medicine with any of the following medications:    atazanavir    nelfinavir  This medicine may also interact with the following medications:    ampicillin    delavirdine    erlotinib    iron salts    medicines for fungal infections like ketoconazole, itraconazole and voriconazole    methotrexate    mycophenolate mofetil    warfarin  What if I miss a dose?  If you miss a dose, take it as soon as you can. If it is almost time for your next dose, take only that dose. Do not take double or extra doses.  Where should I keep my medicine?  Keep out of the reach of children.  Store at room temperature between 15 and 30 degrees C (59 and 86 degrees F). Protect from light and moisture. Throw away any unused medicine after the expiration date.  What should I tell my health care provider before I take this medicine?  They need to know if you have any of these conditions:    liver disease    low levels of magnesium in the blood    lupus    an unusual or allergic reaction to omeprazole, lansoprazole, pantoprazole, rabeprazole, other medicines, foods, dyes, or preservatives    pregnant or trying to get pregnant    breast-feeding  What should I watch for while using this medicine?  It can take several days before your stomach pain gets better. Check with your doctor or health care professional if your condition does not start to get better, or if it gets worse.  You may need blood work done while  you are taking this medicine.  NOTE:This sheet is a summary. It may not cover all possible information. If you have questions about this medicine, talk to your doctor, pharmacist, or health care provider. Copyright  2017 [employer]

## 2018-02-20 NOTE — ED NOTES
BIB EMS for NVD, hypotension 90/50 HR 140s for EMS; being treated for breast cancer.  States ill since Sunday, worse last night. Unable to keep anything down for 2 days. ABCs intact.  Provider at bedside.

## 2018-02-20 NOTE — ED PROVIDER NOTES
Emergency Department Attending Supervision Note  2/20/2018  5:40 PM      I evaluated this patient in conjunction with Max De Los Santos       Briefly, the patient presented with weakness, diarrhea, and episode of low blood pressure.      On my exam, she appears generally weak, with moderately dry oral mucosa,  No reproducible abdominal pain, hyperactive bowel sounds.   Lungs clear, mentation is normal      Impression:  I agree with plan of treatment and admission.  Findings of Colitis likely secondary to infectious cause and findings of possible septic shock.  Patient given appropriated IV fluids.  No hypotension requiring IV vasopressors.  Started on Rocephin.  Unclear at this time if symptoms may be do to C. Difficile.  Patient admitted to Hospitalist.         Diagnosis    ICD-10-CM   1. Septic shock (H) A41.9    R65.21   2. Gastrointestinal hemorrhage, unspecified gastrointestinal hemorrhage type K92.2   3. Diarrhea, unspecified type R19.7              Conor Vargas MD  02/20/18 1746

## 2018-02-20 NOTE — ED PROVIDER NOTES
History     Chief Complaint:  Hypotension, Nausea, Vomiting, and Diarrhea    HPI   Michelle Pedersen is a 67 year old female who presents with hypotension, nausea, vomiting, and diarrhea. The patient is brought in by EMS after experiencing symptoms of nausea, vomiting, and diarrhea for the past 24 hours. She states that she is unsure as to what is causing these symptoms. She also reports some dizziness and diaphoresis with diarrhea. She reports that today he stools became black and tarry as well. She denies any urinary symptoms, fevers, abdominal pain, or any other symptoms.    Allergies:  Penicillins     Medications:    levothyroxine (SSYNTHROID,LEVOTHROID) 100 MCG tablet  simvastatin (ZOCOR) 20 MG tablet  citalopram (CELEXA) 20 MG tablet    Past Medical History:    Breast cancer  Depression  Hyperlipidemia  Hypertension     Past Surgical History:    Esophagoscopy, Gastroscopy, Duodenoscopy, Combined    Family History:    No pertinent family history.    Social History:  Smoking status: Never smoker  Alcohol use: Yes  Marital Status:        Review of Systems   Constitutional: Positive for diaphoresis. Negative for appetite change, chills, fatigue and fever.   Respiratory: Negative for cough and shortness of breath.    Gastrointestinal: Positive for diarrhea, nausea and vomiting. Negative for abdominal pain.   Genitourinary: Negative for difficulty urinating, dysuria and hematuria.   Skin: Negative for wound.   Neurological: Positive for dizziness. Negative for light-headedness and headaches.   All other systems reviewed and are negative.    Physical Exam     Patient Vitals for the past 24 hrs:   BP Temp Temp src Pulse Heart Rate Resp SpO2 Weight   02/20/18 1545 130/60 99.2  F (37.3  C) - 114 - 18 98 % -   02/20/18 1538 138/61 - - 111 - 20 98 % -   02/20/18 1415 - - - - - - 97 % -   02/20/18 1400 (!) 162/125 - - 103 - - 98 % -   02/20/18 1345 (!) 181/105 - - - - - - -   02/20/18 1330 (!) 163/119 - - - - - - -    02/20/18 1315 (!) 211/89 - - 103 - - 93 % -   02/20/18 1300 149/85 - - - - - - -   02/20/18 1245 159/75 98.1  F (36.7  C) - 108 - - 99 % -   02/20/18 1230 148/78 - - - - - - -   02/20/18 1215 (!) 115/105 - - - - - 94 % -   02/20/18 1200 115/80 - - 109 - 20 98 % -   02/20/18 1145 (!) 149/115 - - 114 - 20 100 % -   02/20/18 1141 - - - - - - - 99.8 kg (220 lb)   02/20/18 1130 144/75 - - - - - 96 % -   02/20/18 1115 129/78 - - - - - 99 % -   02/20/18 1050 137/57 97.6  F (36.4  C) Oral 115 115 20 100 % -       Physical Exam  General: Alert, Mild  discomfort, obese.   Eyes: PERRL, conjunctivae pink no scleral icterus or conjunctival injection  ENT:   Moist mucus membranes, posterior oropharynx clear without erythema or exudates, No lymphadenopathy, Normal voice  Resp:  Lungs clear to auscultation bilaterally, no crackles/rubs/wheezes. Good air movement  CV:  Tachycardic rate and rhythm, no murmurs/rubs/gallops  GI:  Abdomen soft and non-distended.  Normoactive BS.  No tenderness, guarding or rebound, No masses  Skin:  Warm, dry.  No rashes or petechiae. Mildly pale with hyperactive bowel sounds  Musculoskeletal: No peripheral edema or calf tenderness, Normal gross ROM   Neuro: Alert and oriented to person/place/time, normal sensation  Psychiatric: Normal affect, cooperative, good eye contact    Emergency Department Course   Imaging:  Abdomen/Pelvis CT with IV contrast:  IMPRESSION: Mild nonspecific left-sided colitis.     Report per radiology.      Laboratory:  CBC:  WBC 20.0 (H), HGB 11.3 (L),   CMP: Glucose 206 (H), BUN 56 (H), otherwise WNL (Creatinine 0.78)  (1116) Lactic Acid: 4.2 (HH)  (1406) Lactic Acid: 2.0   Blood culture #1: pending  Blood culture #2: pending    Occult blood stool: POSITIVE    UA: Clear yellow urine, WNL      Interventions:  (1116) Normal Saline, 1 liter, IV bolus  (1218) Zofran, 2 mg, IV injection  (1230) Lactated ringers BOLUS 2,000 mL, IV  (1300) Ceftriaxone, 1 g, IV infusion  (1340)  Normal Saline, 1 liter, IV bolus    Emergency Department Course:  Nursing notes and vitals reviewed.  (5696) I performed an exam of the patient as documented above.    Blood was drawn from the patient. This was sent for laboratory testing, findings above.   Stool sample was obtained and sent for laboratory analysis, findings above.  Urine sample was obtained and sent for laboratory analysis, findings above.  The patient was sent for a CT scan while in the emergency department, findings above.     Findings and plan explained to the patient who consents to admission.   (4240) I discussed the patient with Dr. Moore of the hospitalist service, who will admit the patient to a med/surg bed for further monitoring, evaluation, and treatment.    Impression & Plan    CMS Diagnoses:   The patient has signs of Septic Shock as evidenced by:    1. Presence of Sepsis, AND  2. Lactic Acid level >4    Time sepsis diagnosis confirmed = 1139 as this was the time whenLactate was resulted and the level was >4      3 Hour Septic Shock Bundle Completion:  1. Initial Lactic Acid Result:   Recent Labs   Lab Test  02/20/18   1406  02/20/18   1116  10/26/11   1750   LACT  2.0  4.2*  1.5     2. Blood Cultures before Antibiotics: Yes  3. Broad Spectrum Antibiotics Administered: Yes     Anti-infectives (Future)    Start     Dose/Rate Route Frequency Ordered Stop    02/20/18 1545  cefTRIAXone in d5w (ROCEPHIN) intermittent infusion 1 g      1 g  over 30 Minutes Intravenous ONCE 02/20/18 1545          4. Full 30mL/kg bolus not administered due to ESRD      6 Hour Severe Sepsis Bundle Completion:  1. Repeat Lactic Acid Level: 2.0  2. MAP>65 after initial IVF bolus, will continue to monitor fluid status and vital signs  I attest to having performed a repeat sepsis exam and assessment of perfusion at 1440 and the results demonstrate no change.        and None    Medical Decision Making:  Michelle Pedersen is a 67 year old female who presents today for  evaluation of diarrhea.  She states that for the last 24-36 hours she has had increasing bouts of diarrhea over the last few episodes she has noted that it turned black and tarry.  Her Hemoccult is positive.  Her initial white cell count was 20 with an elevated lactic acid of 4.2 no obvious source of infection at this point however this does categorize her in septic shock.  She was given the above treatment with good resolution of lactic acid to 2.0.  The remainder of her laboratory studies are noncontributory.  CT scan shows mild nonspecific left-sided colitis.  She was given antibiotics as above.  Despite qualifying as septic shock she looks remarkably well.  She will be admitted.  I spoke to Dr. Moore who will accept them to their service.      Diagnosis:    ICD-10-CM   1. Septic shock (H) A41.9   2. Gastrointestinal hemorrhage, unspecified gastrointestinal hemorrhage type K92.2   3. Diarrhea, unspecified type R19.7       Disposition:  Patient is admitted to a med/surg bed under the care of Dr. Moore.            I, Seven Tsang, am serving as a scribe on 2/20/2018 at 10:48 AM to personally document services performed by ROSALINDA De Oliveira, CNP based on my observations and the provider's statements to me.       Seven Tsang  2/20/2018   [facility] EMERGENCY DEPARTMENT       Max De Los Santos APRN CNP  02/20/18 5217

## 2018-02-20 NOTE — PHARMACY-ADMISSION MEDICATION HISTORY
Admission medication history interview status for this patient is complete. See [facility] admission navigator for allergy information, prior to admission medications and immunization status.     Medication history interview source(s):Patient and Family  Medication history resources (including written lists, pill bottles, clinic record):None  Primary pharmacy:[facility] [place]    Changes made to PTA medication list:  Added: vit d, ca vit d, glucosamine, anastrozole  Deleted: -  Changed: -    Actions taken by pharmacist (provider contacted, etc):None     Additional medication history information:None    Medication reconciliation/reorder completed by provider prior to medication history? No    Do you take OTC medications (eg tylenol, ibuprofen, fish oil, eye/ear drops, etc)? yes(Y/N)    For patients on insulin therapy: no (Y/N)  Lantus/levemir/NPH/Mix 70/30 dose:   (Y/N) (see Med list for doses)   Sliding scale Novolog Y/N  If Yes, do you have a baseline novolog pre-meal dose:  units with meals  Patients eat three meals a day:   Y/N    How many episodes of hypoglycemia do you have per week: _______  How many missed doses do you have per week: ______  How many times do you check your blood glucose per day: _______   Any Barriers to therapy - Be specific :  cost of medications, comfortable with giving injections (if applicable), comfortable and confident with current diabetes regimen: Y/N ______________      Prior to Admission medications    Medication Sig Last Dose Taking? Auth Provider   Cholecalciferol (VITAMIN D-3) 1000 UNITS CAPS Take 2,000 Units by mouth daily 2/19/2018 at am Yes Unknown, Entered By History   anastrozole (ARIMIDEX) 1 MG tablet Take 1 mg by mouth daily 2/19/2018 at am Yes Unknown, Entered By History   Glucosamine-Chondroitin 250-200 MG TABS Take 1 tablet by mouth daily 2/19/2018 at am Yes Unknown, Entered By History   melatonin 5 MG tablet Take 5 mg by mouth nightly as needed for sleep  Yes Unknown,  Entered By History   fish oil-omega-3 fatty acids 1000 MG capsule Take 1 g by mouth daily 2/19/2018 at am Yes Unknown, Entered By History   calcium-vitamin D (CALTRATE) 600-400 MG-UNIT per tablet Take 1 tablet by mouth daily 2/19/2018 at am Yes Unknown, Entered By History   levothyroxine (SSYNTHROID,LEVOTHROID) 100 MCG tablet Take 100 mcg by mouth daily. 2/19/2018 at am Yes Reported, Patient   simvastatin (ZOCOR) 20 MG tablet Take 40 mg by mouth At Bedtime  2/19/2018 at Unknown time Yes Reported, Patient   citalopram (CELEXA) 20 MG tablet Take 20 mg by mouth daily. 2/19/2018 at am Yes Reported, Patient

## 2018-02-20 NOTE — ED NOTES
[facility] [place]  ED Nurse Handoff Report    Michelle Pedersen is a 67 year old female   ED Chief complaint: Hypotension and Nausea, Vomiting, & Diarrhea  . ED Diagnosis:   Final diagnoses:   Septic shock (H)   Gastrointestinal hemorrhage, unspecified gastrointestinal hemorrhage type   Diarrhea, unspecified type     Allergies:   Allergies   Allergen Reactions     Penicillins      Rash and itching         Code Status: Full Code  Activity level - Baseline/Home:  Independent. Activity Level - Current:   Stand with Assist of 2. Lift room needed: No. Bariatric: No   Needed: No   Isolation: Yes. Infection: Yes  C-Diff Pending.     Vital Signs:   Vitals:    02/20/18 1345 02/20/18 1400 02/20/18 1415 02/20/18 1538   BP: (!) 181/105 (!) 162/125  138/61   Pulse:  103  111   Resp:    20   Temp:       TempSrc:       SpO2:  98% 97% 98%   Weight:           Cardiac Rhythm:  ,      Pain level:    Patient confused: No. Patient Falls Risk: Yes.   Elimination Status: Has voided , sarkar cath in place    Patient Report - Initial Complaint: pt BIB EMS with tachycardia in the 140s, hypotension 90s/50s, NVD x 2 days, unable to keep anything down.  Pt soiled neck to toe in dried black stool.  . Focused Assessment: pt neuros intact, LS clear bilaterally; pt pale, abdomen soft rounded, nontender; pt reports NVD x 2 days; soiled with black stool.  Pt given sponge bath in ED.   Tests Performed:   Labs Ordered and Resulted from Time of ED Arrival Up to the Time of Departure from the ED   CBC WITH PLATELETS - Abnormal; Notable for the following:        Result Value    WBC 20.0 (*)     Hemoglobin 11.3 (*)     MCH 25.2 (*)     MCHC 29.7 (*)     RDW 19.6 (*)     All other components within normal limits   COMPREHENSIVE METABOLIC PANEL - Abnormal; Notable for the following:     Glucose 206 (*)     Urea Nitrogen 56 (*)     All other components within normal limits   LACTIC ACID - Abnormal; Notable for the following:     Lactic Acid 4.2  (*)     All other components within normal limits   OCCULT BLOOD STOOL - Abnormal; Notable for the following:     Occult Blood Positive (*)     All other components within normal limits   URINE MACROSCOPIC WITH REFLEX TO MICRO   LACTIC ACID   MAY SALINE LOCK IV   BLOOD CULTURE   BLOOD CULTURE   ENTERIC BACTERIA AND VIRUS PANEL BY FELICITY STOOL   CLOSTRIDIUM DIFFICILE TOXIN B     CT Abdomen Pelvis w Contrast   Final Result   IMPRESSION: Mild nonspecific left-sided colitis.         AKASH HERNANDEZ MD        NEED STOOL SAMPLE!!!  NO further stools since arrived.    . Abnormal Results: see above   Treatments provided: 2 L NS, 2 L LR, rocephin and zofran IV  Family Comments: at bedside  OBS brochure/video discussed/provided to patient:  N/A  ED Medications:   Medications   iohexol (OMNIPAQUE) solution 25 mL (not administered)   0.9% sodium chloride BOLUS (0 mLs Intravenous Stopped 2/20/18 1230)   lactated ringers BOLUS 2,000 mL (0 mLs Intravenous Stopped 2/20/18 1340)   cefTRIAXone in d5w (ROCEPHIN) intermittent infusion 1 g (0 g Intravenous Stopped 2/20/18 1300)   ondansetron (ZOFRAN) 2 MG/ML injection (4 mg  Given 2/20/18 1218)   0.9% sodium chloride BOLUS (0 mLs Intravenous Stopped 2/20/18 1426)   iopamidol (ISOVUE-370) solution 500 mL (100 mLs Intravenous Given 2/20/18 1421)     Drips infusing:  No  For the majority of the shift, the patient's behavior Green. Interventions performed were na.     Severe Sepsis OR Septic Shock Diagnosis Present:   Yes    Per the ED Provider, Time Zero for severe sepsis or septic shock is:  1139    3 Hour Severe Sepsis Bundle Completion:  1. Initial Lactic Acid Result:   Recent Labs   Lab Test  02/20/18   1406  02/20/18   1116  10/26/11   1750   LACT  2.0  4.2*  1.5     2. Blood Cultures before Antibiotics: Yes  3. Broad Spectrum Antibiotics Administered:  Rocephin IV     Anti-infectives     None        4. 3000 ml of IV fluids have been given so far      6 Hour Severe Sepsis Bundle  Completion:    1. Repeat Lactic Acid Level: 2.0  2. Patient currently on Vasopressors =  No      ED Nurse Name/Phone Number: Codi QUANBella Baer,   3:39 PM    RECEIVING UNIT ED HANDOFF REVIEW    Above ED Nurse Handoff Report was reviewed: yes  Reviewed by: Ritesh Chavez on February 20, 2018 at 5:12 PM

## 2018-02-21 LAB
ANION GAP SERPL CALCULATED.3IONS-SCNC: 2 MMOL/L (ref 3–14)
BLD PROD TYP BPU: NORMAL
BLD UNIT ID BPU: 0
BLOOD PRODUCT CODE: NORMAL
BPU ID: NORMAL
BUN SERPL-MCNC: 33 MG/DL (ref 7–30)
C DIFF TOX B STL QL: NEGATIVE
CALCIUM SERPL-MCNC: 7.9 MG/DL (ref 8.5–10.1)
CHLORIDE SERPL-SCNC: 116 MMOL/L (ref 94–109)
CO2 SERPL-SCNC: 28 MMOL/L (ref 20–32)
CREAT SERPL-MCNC: 0.72 MG/DL (ref 0.52–1.04)
GFR SERPL CREATININE-BSD FRML MDRD: 81 ML/MIN/1.7M2
GLUCOSE SERPL-MCNC: 117 MG/DL (ref 70–99)
HGB BLD-MCNC: 6.8 G/DL (ref 11.7–15.7)
HGB BLD-MCNC: 7.5 G/DL (ref 11.7–15.7)
HGB BLD-MCNC: 7.6 G/DL (ref 11.7–15.7)
HGB BLD-MCNC: 8.6 G/DL (ref 11.7–15.7)
LACTATE BLD-SCNC: 1.2 MMOL/L (ref 0.7–2)
POTASSIUM SERPL-SCNC: 4.7 MMOL/L (ref 3.4–5.3)
SODIUM SERPL-SCNC: 146 MMOL/L (ref 133–144)
SPECIMEN SOURCE: NORMAL
TRANSFUSION STATUS PATIENT QL: NORMAL
TRANSFUSION STATUS PATIENT QL: NORMAL
UPPER GI ENDOSCOPY: NORMAL

## 2018-02-21 PROCEDURE — 88305 TISSUE EXAM BY PATHOLOGIST: CPT | Mod: 26 | Performed by: INTERNAL MEDICINE

## 2018-02-21 PROCEDURE — 86923 COMPATIBILITY TEST ELECTRIC: CPT | Performed by: INTERNAL MEDICINE

## 2018-02-21 PROCEDURE — 12000000 ZZH R&B MED SURG/OB

## 2018-02-21 PROCEDURE — P9016 RBC LEUKOCYTES REDUCED: HCPCS | Performed by: INTERNAL MEDICINE

## 2018-02-21 PROCEDURE — 40000104 ZZH STATISTIC MODERATE SEDATION < 10 MIN: Performed by: INTERNAL MEDICINE

## 2018-02-21 PROCEDURE — 80048 BASIC METABOLIC PNL TOTAL CA: CPT | Performed by: INTERNAL MEDICINE

## 2018-02-21 PROCEDURE — 0DD68ZX EXTRACTION OF STOMACH, VIA NATURAL OR ARTIFICIAL OPENING ENDOSCOPIC, DIAGNOSTIC: ICD-10-PCS | Performed by: INTERNAL MEDICINE

## 2018-02-21 PROCEDURE — 86901 BLOOD TYPING SEROLOGIC RH(D): CPT | Performed by: INTERNAL MEDICINE

## 2018-02-21 PROCEDURE — 25000128 H RX IP 250 OP 636: Performed by: INTERNAL MEDICINE

## 2018-02-21 PROCEDURE — 86900 BLOOD TYPING SEROLOGIC ABO: CPT | Performed by: INTERNAL MEDICINE

## 2018-02-21 PROCEDURE — 43239 EGD BIOPSY SINGLE/MULTIPLE: CPT | Performed by: INTERNAL MEDICINE

## 2018-02-21 PROCEDURE — 25000132 ZZH RX MED GY IP 250 OP 250 PS 637: Mod: GY | Performed by: INTERNAL MEDICINE

## 2018-02-21 PROCEDURE — 83605 ASSAY OF LACTIC ACID: CPT | Performed by: INTERNAL MEDICINE

## 2018-02-21 PROCEDURE — 25000125 ZZHC RX 250: Performed by: INTERNAL MEDICINE

## 2018-02-21 PROCEDURE — 99207 ZZC CDG-MDM COMPONENT: MEETS LOW - DOWN CODED: CPT | Performed by: INTERNAL MEDICINE

## 2018-02-21 PROCEDURE — 36415 COLL VENOUS BLD VENIPUNCTURE: CPT | Performed by: INTERNAL MEDICINE

## 2018-02-21 PROCEDURE — 85018 HEMOGLOBIN: CPT | Performed by: INTERNAL MEDICINE

## 2018-02-21 PROCEDURE — 88305 TISSUE EXAM BY PATHOLOGIST: CPT | Performed by: INTERNAL MEDICINE

## 2018-02-21 PROCEDURE — 99232 SBSQ HOSP IP/OBS MODERATE 35: CPT | Performed by: INTERNAL MEDICINE

## 2018-02-21 PROCEDURE — 87493 C DIFF AMPLIFIED PROBE: CPT | Performed by: INTERNAL MEDICINE

## 2018-02-21 PROCEDURE — 86850 RBC ANTIBODY SCREEN: CPT | Performed by: INTERNAL MEDICINE

## 2018-02-21 PROCEDURE — 87506 IADNA-DNA/RNA PROBE TQ 6-11: CPT | Performed by: INTERNAL MEDICINE

## 2018-02-21 PROCEDURE — A9270 NON-COVERED ITEM OR SERVICE: HCPCS | Mod: GY | Performed by: INTERNAL MEDICINE

## 2018-02-21 RX ORDER — FENTANYL CITRATE 50 UG/ML
INJECTION, SOLUTION INTRAMUSCULAR; INTRAVENOUS PRN
Status: DISCONTINUED | OUTPATIENT
Start: 2018-02-21 | End: 2018-02-21

## 2018-02-21 RX ORDER — FLUMAZENIL 0.1 MG/ML
0.2 INJECTION, SOLUTION INTRAVENOUS
Status: ACTIVE | OUTPATIENT
Start: 2018-02-21 | End: 2018-02-22

## 2018-02-21 RX ORDER — LIDOCAINE 40 MG/G
CREAM TOPICAL
Status: DISCONTINUED | OUTPATIENT
Start: 2018-02-21 | End: 2018-02-24

## 2018-02-21 RX ORDER — PANTOPRAZOLE SODIUM 40 MG/1
40 TABLET, DELAYED RELEASE ORAL
Status: DISCONTINUED | OUTPATIENT
Start: 2018-02-21 | End: 2018-02-22

## 2018-02-21 RX ORDER — NALOXONE HYDROCHLORIDE 0.4 MG/ML
.1-.4 INJECTION, SOLUTION INTRAMUSCULAR; INTRAVENOUS; SUBCUTANEOUS
Status: DISCONTINUED | OUTPATIENT
Start: 2018-02-21 | End: 2018-02-21

## 2018-02-21 RX ADMIN — PANTOPRAZOLE SODIUM 40 MG: 40 TABLET, DELAYED RELEASE ORAL at 17:31

## 2018-02-21 RX ADMIN — ACETAMINOPHEN 650 MG: 325 TABLET, FILM COATED ORAL at 09:38

## 2018-02-21 RX ADMIN — CITALOPRAM HYDROBROMIDE 20 MG: 20 TABLET ORAL at 08:51

## 2018-02-21 RX ADMIN — ANASTROZOLE 1 MG: 1 TABLET, COATED ORAL at 08:51

## 2018-02-21 RX ADMIN — PANTOPRAZOLE SODIUM 40 MG: 40 INJECTION, POWDER, FOR SOLUTION INTRAVENOUS at 05:50

## 2018-02-21 RX ADMIN — PROCHLORPERAZINE MALEATE 5 MG: 5 TABLET, FILM COATED ORAL at 14:08

## 2018-02-21 RX ADMIN — METRONIDAZOLE 500 MG: 500 INJECTION, SOLUTION INTRAVENOUS at 09:43

## 2018-02-21 RX ADMIN — LEVOTHYROXINE SODIUM 100 MCG: 100 TABLET ORAL at 08:51

## 2018-02-21 RX ADMIN — SIMVASTATIN 40 MG: 40 TABLET, FILM COATED ORAL at 22:20

## 2018-02-21 RX ADMIN — ACETAMINOPHEN 650 MG: 325 TABLET, FILM COATED ORAL at 20:44

## 2018-02-21 RX ADMIN — ONDANSETRON 4 MG: 2 INJECTION INTRAMUSCULAR; INTRAVENOUS at 09:40

## 2018-02-21 RX ADMIN — CEFTRIAXONE SODIUM 1 G: 1 INJECTION, SOLUTION INTRAVENOUS at 16:20

## 2018-02-21 RX ADMIN — SODIUM CHLORIDE: 9 INJECTION, SOLUTION INTRAVENOUS at 04:27

## 2018-02-21 RX ADMIN — ACETAMINOPHEN 650 MG: 325 TABLET, FILM COATED ORAL at 00:18

## 2018-02-21 RX ADMIN — METRONIDAZOLE 500 MG: 500 INJECTION, SOLUTION INTRAVENOUS at 02:22

## 2018-02-21 ASSESSMENT — ACTIVITIES OF DAILY LIVING (ADL)
ADLS_ACUITY_SCORE: 13
ADLS_ACUITY_SCORE: 11
ADLS_ACUITY_SCORE: 13
ADLS_ACUITY_SCORE: 11
ADLS_ACUITY_SCORE: 13

## 2018-02-21 NOTE — PROGRESS NOTES
[facility]    Hospitalist Progress Note    Date of Service (when I saw the patient): 02/21/2018    Assessment & Plan   Michelle Pedersen is a 67 year old female who presents with 1-2 days of diarrhea.  Stools turned black earlier today.  Patient dehydrated upon presentation with elevated lactate has improved with IV fluid resuscitation.  There is no evidence for infection however CT scan did suggest possible mild inflammatory change of the transverse and upper descending colon.        1.  Suspect upper gastrointestinal bleeding with presentation of melena:  EGD with the following results:  The esophagus was normal.        Patchy mildly erythematous mucosa with stigmata of recent bleeding was        found on the greater curvature of the stomach. Biopsies were taken with        a cold forceps for histology. Verification of patient identification for        the specimen was done. Estimated blood loss was minimal.        Diffuse mildly erythematous mucosa without active bleeding and with no        stigmata of bleeding was found in the duodenal bulb.                                                                                     Impression:               - Normal esophagus.                             - Erythematous mucosa in the greater curvature.                             Biopsied.                             - Erythematous duodenopathy.    Suspect bleeding due to the also changes on the greater curvature of the stomach    Change Protonix to 40 mg b.i.d. oral    IV fluids    GI consultation appreciated, await biopsy results.    Hemoglobin and settling around 7.5, recheck hemoglobin in a.m. does not appear to be actively bleeding at this point in time.    Data infectious etiology and will stop Rocephin and Flagyl.     2.  Hypothyroidism    Resume Synthroid     3.  History of breast cancer    Resume Arimidex     4.  Depression    Resume antidepressant medication.     DVT Prophylaxis: Pneumatic Compression  Devices  Code Status: Full Code     Disposition: Expected discharge in 2 days if bleeding stopped.       Darrion Moore MD  Text Page    Interval History   Patient is feeling tired.  She is very thirsty would like something to drink.  Has some nausea but no vomiting.  No loose stools.  No fever sweats or chills.    -Data reviewed today: I reviewed all new labs and imaging results over the last 24 hours. I personally reviewed no images or EKG's today.    Physical Exam   Temp: 99.1  F (37.3  C) Temp src: Oral BP: 121/55 Pulse: 111 Heart Rate: 104 Resp: 16 SpO2: 97 % O2 Device: None (Room air) Oxygen Delivery: 4 LPM  Vitals:    02/20/18 1141 02/21/18 0600   Weight: 99.8 kg (220 lb) 110 kg (242 lb 9.6 oz)     Vital Signs with Ranges  Temp:  [98.6  F (37  C)-99.9  F (37.7  C)] 99.1  F (37.3  C)  Pulse:  [111] 111  Heart Rate:  [104-116] 104  Resp:  [12-25] 16  BP: (101-137)/(50-81) 121/55  SpO2:  [83 %-100 %] 97 %  I/O last 3 completed shifts:  In: 1973 [I.V.:1973]  Out: 1800 [Urine:1800]    Constitutional: Awake, alert, cooperative, no apparent distress   Respiratory: Clear to auscultation bilaterally, no crackles or wheezing   Cardiovascular: Regular rate and rhythm, normal S1 and S2, and no murmur noted   Abdomen: Normal bowel sounds, soft, non-distended, non-tender   Skin: No rashes, no cyanosis, dry to touch   Neuro: Alert and oriented x3, no weakness, numbness, memory loss   Extremities: No edema, normal range of motion   Other(s):        All other systems: Negative       Medications     - MEDICATION INSTRUCTIONS -       - MEDICATION INSTRUCTIONS -       - MEDICATION INSTRUCTIONS -       sodium chloride 50 mL/hr at 02/21/18 1408       sodium chloride (PF)  3 mL Intracatheter Q8H     pantoprazole  40 mg Oral BID AC     anastrozole  1 mg Oral Daily     citalopram  20 mg Oral Daily     levothyroxine  100 mcg Oral Daily     simvastatin  40 mg Oral At Bedtime       Data     Recent Labs  Lab 02/21/18  1252  02/21/18  0706 02/21/18  0012  02/20/18  1116   WBC  --   --   --   --  20.0*   HGB 7.5* 7.6* 8.6*  < > 11.3*   MCV  --   --   --   --  85   PLT  --   --   --   --  280   NA  --  146*  --   --  140   POTASSIUM  --  4.7  --   --  4.9   CHLORIDE  --  116*  --   --  109   CO2  --  28  --   --  20   BUN  --  33*  --   --  56*   CR  --  0.72  --   --  0.78   ANIONGAP  --  2*  --   --  11   VIRI  --  7.9*  --   --  9.3   GLC  --  117*  --   --  206*   ALBUMIN  --   --   --   --  3.4   PROTTOTAL  --   --   --   --  7.3   BILITOTAL  --   --   --   --  0.4   ALKPHOS  --   --   --   --  64   ALT  --   --   --   --  19   AST  --   --   --   --  13   < > = values in this interval not displayed.    Imaging:  No results found for this or any previous visit (from the past 24 hour(s)).     normal...

## 2018-02-21 NOTE — PLAN OF CARE
Problem: Patient Care Overview  Goal: Plan of Care/Patient Progress Review  Outcome: No Change  Pt remains hospitalized for possible GI bleed.   Low grade temp 99.9, last recheck 98.6, tachy, on RA, other vitals stable.   PIV /hr, continues IV Flagyl, Rocephin, and Protonix.   C/O HA Tylenol x 1 and ice pack used.   Has not been out of bed, assist x 1-2.   Yañez output adequate. No stools this shift, sample needed.   Hgb check @ 0000 8.6.  Sepsis triggered Lactic 1.2.  Daughter at bedside.   Consults: GI

## 2018-02-21 NOTE — PLAN OF CARE
Problem: Patient Care Overview  Goal: Plan of Care/Patient Progress Review  Outcome: No Change  Pt continues to be hospitalized for GI bleed. Temp max 99.1. No stools this shift, still need sample collected. Had EGD completed this afternoon. Given zofran and compazine for nausea. Diet advanced to clears. Yañez out at 1420. Tx; IV antibiotics. Serial hemoglobins, last check 7.5. Pt signed consent to receive blood if hemoglobin <7. Up assist of one. Family at bedside.

## 2018-02-21 NOTE — H&P
Pre-Endoscopy History and Physical     Michelle Pedersen MRN# [id number]   YOB: 1950 Age: 67 year old     Date of Procedure: 2/20/2018  Primary care provider: Vidya Serna  Type of Endoscopy: Gastroscopy with possible biopsy, possible dilation  Reason for Procedure: bleed  Type of Anesthesia Anticipated: Conscious Sedation    HPI:    Michelle is a 67 year old female who will be undergoing the above procedure.      A history and physical has been performed. The patient's medications and allergies have been reviewed. The risks and benefits of the procedure and the sedation options and risks were discussed with the patient.  All questions were answered and informed consent was obtained.      She denies a personal or family history of anesthesia complications or bleeding disorders.     Patient Active Problem List   Diagnosis     GI bleed        Past Medical History:   Diagnosis Date     Breast cancer (H)      Depression      High cholesterol      Hypothyroid         Past Surgical History:   Procedure Laterality Date     ESOPHAGOSCOPY, GASTROSCOPY, DUODENOSCOPY (EGD), COMBINED  10/27/2011    Procedure:COMBINED ESOPHAGOSCOPY, GASTROSCOPY, DUODENOSCOPY (EGD), BIOPSY SINGLE OR MULTIPLE; Esophagoscopy, Gastroscopy, Duodenoscopy cold biopsy ; Surgeon:NADIA MORALES; Location: GI       Social History   Substance Use Topics     Smoking status: Never Smoker     Smokeless tobacco: Never Used     Alcohol use Yes      Comment: rarely       No family history on file.    Prior to Admission medications    Medication Sig Start Date End Date Taking? Authorizing Provider   Cholecalciferol (VITAMIN D-3) 1000 UNITS CAPS Take 2,000 Units by mouth daily   Yes Unknown, Entered By History   anastrozole (ARIMIDEX) 1 MG tablet Take 1 mg by mouth daily   Yes Unknown, Entered By History   Glucosamine-Chondroitin 250-200 MG TABS Take 1 tablet by mouth daily   Yes Unknown, Entered By History   melatonin 5 MG tablet Take 5 mg by mouth  "nightly as needed for sleep   Yes Unknown, Entered By History   fish oil-omega-3 fatty acids 1000 MG capsule Take 1 g by mouth daily   Yes Unknown, Entered By History   calcium-vitamin D (CALTRATE) 600-400 MG-UNIT per tablet Take 1 tablet by mouth daily   Yes Unknown, Entered By History   levothyroxine (SSYNTHROID,LEVOTHROID) 100 MCG tablet Take 100 mcg by mouth daily.   Yes Reported, Patient   simvastatin (ZOCOR) 20 MG tablet Take 40 mg by mouth At Bedtime    Yes Reported, Patient   citalopram (CELEXA) 20 MG tablet Take 20 mg by mouth daily.   Yes Reported, Patient       Allergies   Allergen Reactions     Penicillins Itching and Rash     Tolerated ceftriaxone          REVIEW OF SYSTEMS:   5 point ROS negative except as noted above in HPI, including Gen., Resp., CV, GI &  system review.    PHYSICAL EXAM:   /59 (BP Location: Right arm)  Pulse 111  Temp 99.1  F (37.3  C) (Oral)  Resp 20  Wt 110 kg (242 lb 9.6 oz)  SpO2 98%  BMI 44.37 kg/m2 Estimated body mass index is 44.37 kg/(m^2) as calculated from the following:    Height as of 10/26/11: 1.575 m (5' 2\").    Weight as of this encounter: 110 kg (242 lb 9.6 oz).   GENERAL APPEARANCE: alert, and oriented  MENTAL STATUS: alert  AIRWAY EXAM: Mallampatti Class I (visualization of the soft palate, fauces, uvula, anterior and posterior pillars)  RESP: lungs clear to auscultation - no rales, rhonchi or wheezes  CV: regular rates and rhythm  DIAGNOSTICS:    Not indicated    IMPRESSION   ASA Class 2 - Mild systemic disease    PLAN:   Plan for gastroscopy with possible biopsy, possible polypectomy. We discussed the risks, benefits and alternatives and the patient wished to proceed.    The above has been forwarded to the consulting provider.      Signed Electronically by: Gen York  February 21, 2018          "

## 2018-02-21 NOTE — PLAN OF CARE
Problem: Patient Care Overview  Goal: Plan of Care/Patient Progress Review  Patient admitted from ed w/ L side colitis/potential GI bleed. T max 99.8, prn tylenol provided, tachycardic, other vss. Ice applied to neck for headache w/ relief. Bowel sounds active, passing gas, unable to obtain stool sample, remains on enteric precautions. Prn zofran x1 for nausea. Yañez in place, patent. Has not been oob. Npo except medications. Awaiting gi consult. HGb stable 8.7, recheck at midnight.

## 2018-02-21 NOTE — PROGRESS NOTES
Infection Prevention:    Patient placed on Enteric precautions because of diarrhea with possible infectious etiology. Please contact Infection Prevention with any questions/concerns at *[phone].    Mary Inman, ICP

## 2018-02-22 LAB
ANION GAP SERPL CALCULATED.3IONS-SCNC: 4 MMOL/L (ref 3–14)
BLD PROD TYP BPU: NORMAL
BLD UNIT ID BPU: 0
BLOOD PRODUCT CODE: NORMAL
BPU ID: NORMAL
BUN SERPL-MCNC: 26 MG/DL (ref 7–30)
C COLI+JEJUNI+LARI FUSA STL QL NAA+PROBE: NOT DETECTED
CALCIUM SERPL-MCNC: 7.5 MG/DL (ref 8.5–10.1)
CHLORIDE SERPL-SCNC: 115 MMOL/L (ref 94–109)
CO2 SERPL-SCNC: 26 MMOL/L (ref 20–32)
COPATH REPORT: NORMAL
CREAT SERPL-MCNC: 0.59 MG/DL (ref 0.52–1.04)
EC STX1 GENE STL QL NAA+PROBE: NOT DETECTED
EC STX2 GENE STL QL NAA+PROBE: NOT DETECTED
ENTERIC PATHOGEN COMMENT: NORMAL
GFR SERPL CREATININE-BSD FRML MDRD: >90 ML/MIN/1.7M2
GLUCOSE SERPL-MCNC: 102 MG/DL (ref 70–99)
HGB BLD-MCNC: 6.6 G/DL (ref 11.7–15.7)
HGB BLD-MCNC: 7.1 G/DL (ref 11.7–15.7)
HGB BLD-MCNC: 7.2 G/DL (ref 11.7–15.7)
HGB BLD-MCNC: 7.8 G/DL (ref 11.7–15.7)
LACTATE BLD-SCNC: 0.5 MMOL/L (ref 0.7–2)
NOROV GI+II ORF1-ORF2 JNC STL QL NAA+PR: NOT DETECTED
POTASSIUM SERPL-SCNC: 4.1 MMOL/L (ref 3.4–5.3)
RVA NSP5 STL QL NAA+PROBE: NOT DETECTED
SALMONELLA SP RPOD STL QL NAA+PROBE: NOT DETECTED
SHIGELLA SP+EIEC IPAH STL QL NAA+PROBE: NOT DETECTED
SODIUM SERPL-SCNC: 145 MMOL/L (ref 133–144)
TRANSFUSION STATUS PATIENT QL: NORMAL
TRANSFUSION STATUS PATIENT QL: NORMAL
V CHOL+PARA RFBL+TRKH+TNAA STL QL NAA+PR: NOT DETECTED
Y ENTERO RECN STL QL NAA+PROBE: NOT DETECTED

## 2018-02-22 PROCEDURE — 25000132 ZZH RX MED GY IP 250 OP 250 PS 637: Mod: GY | Performed by: INTERNAL MEDICINE

## 2018-02-22 PROCEDURE — 80048 BASIC METABOLIC PNL TOTAL CA: CPT | Performed by: INTERNAL MEDICINE

## 2018-02-22 PROCEDURE — P9016 RBC LEUKOCYTES REDUCED: HCPCS | Performed by: INTERNAL MEDICINE

## 2018-02-22 PROCEDURE — 36415 COLL VENOUS BLD VENIPUNCTURE: CPT | Performed by: INTERNAL MEDICINE

## 2018-02-22 PROCEDURE — 83605 ASSAY OF LACTIC ACID: CPT | Performed by: INTERNAL MEDICINE

## 2018-02-22 PROCEDURE — 85018 HEMOGLOBIN: CPT | Performed by: INTERNAL MEDICINE

## 2018-02-22 PROCEDURE — 25000125 ZZHC RX 250: Performed by: INTERNAL MEDICINE

## 2018-02-22 PROCEDURE — 12000000 ZZH R&B MED SURG/OB

## 2018-02-22 PROCEDURE — A9270 NON-COVERED ITEM OR SERVICE: HCPCS | Mod: GY | Performed by: INTERNAL MEDICINE

## 2018-02-22 PROCEDURE — 25000128 H RX IP 250 OP 636: Performed by: INTERNAL MEDICINE

## 2018-02-22 PROCEDURE — 99233 SBSQ HOSP IP/OBS HIGH 50: CPT | Performed by: INTERNAL MEDICINE

## 2018-02-22 RX ADMIN — PROCHLORPERAZINE EDISYLATE 5 MG: 5 INJECTION INTRAMUSCULAR; INTRAVENOUS at 11:47

## 2018-02-22 RX ADMIN — LEVOTHYROXINE SODIUM 100 MCG: 100 TABLET ORAL at 08:38

## 2018-02-22 RX ADMIN — ANASTROZOLE 1 MG: 1 TABLET, COATED ORAL at 08:38

## 2018-02-22 RX ADMIN — ONDANSETRON 4 MG: 2 INJECTION INTRAMUSCULAR; INTRAVENOUS at 06:39

## 2018-02-22 RX ADMIN — CITALOPRAM HYDROBROMIDE 20 MG: 20 TABLET ORAL at 08:38

## 2018-02-22 RX ADMIN — PANTOPRAZOLE SODIUM 40 MG: 40 TABLET, DELAYED RELEASE ORAL at 06:39

## 2018-02-22 RX ADMIN — SIMVASTATIN 40 MG: 40 TABLET, FILM COATED ORAL at 21:23

## 2018-02-22 RX ADMIN — PANTOPRAZOLE SODIUM 40 MG: 40 INJECTION, POWDER, FOR SOLUTION INTRAVENOUS at 19:06

## 2018-02-22 RX ADMIN — ACETAMINOPHEN 650 MG: 325 TABLET, FILM COATED ORAL at 21:23

## 2018-02-22 ASSESSMENT — ACTIVITIES OF DAILY LIVING (ADL)
ADLS_ACUITY_SCORE: 11

## 2018-02-22 ASSESSMENT — PAIN DESCRIPTION - DESCRIPTORS: DESCRIPTORS: HEADACHE

## 2018-02-22 NOTE — PLAN OF CARE
Problem: Patient Care Overview  Goal: Plan of Care/Patient Progress Review  Outcome: No Change  Pt continues to be hospitalized for GI bleed. Low grade temp, otherwise VSS. I unit of blood finished transfusing this morning. Hmg check at 1200=7.8. Compazine IV given x1 with relief. Tolerating sips of water, per GI no further testing at this time, no need to be NPO. x2 loose/black stools. Assist of one, up to bedside commode due to feeling weak,dizzy,nausous when ambulating. Discharge TBD.

## 2018-02-22 NOTE — PROGRESS NOTES
"[facility]    Hospitalist Progress Note    Date of Service (when I saw the patient): 02/22/2018    Assessment & Plan   Michelle Pedersen is a 67 year old female who presents with 1-2 days of diarrhea.  Stools turned black earlier today.  Patient dehydrated upon presentation with elevated lactate has improved with IV fluid resuscitation.  There is no evidence for infection however CT scan did suggest possible mild inflammatory change of the transverse and upper descending colon.        1.  Suspect upper gastrointestinal bleeding with presentation of melena:  EGD with the following results:  The esophagus was normal.        Patchy mildly erythematous mucosa with stigmata of recent bleeding was        found on the greater curvature of the stomach. Biopsies were taken with        a cold forceps for histology. Verification of patient identification for        the specimen was done. Estimated blood loss was minimal.        Diffuse mildly erythematous mucosa without active bleeding and with no        stigmata of bleeding was found in the duodenal bulb.                                                                                     Impression:               - Normal esophagus.                             - Erythematous mucosa in the greater curvature.                             Biopsied.                             - Erythematous duodenopathy.    Patient with slight dip in hemoglobin and needing blood last night, will transfuse again this am to keep hemoglobin >7.  Suspect no acute bleeding, likely happened Sunday or Monday.     Suspect bleeding due to the changes on the greater curvature of the stomach    Change Protonix to 40 mg b.i.d. oral    IV fluids, will decreaswe    GI consultation appreciated, await biopsy results.    Further evaluation per GI\"\"       2.  Hypothyroidism    Resume Synthroid     3.  History of breast cancer    Resume Arimidex     4.  Depression    Resume antidepressant " medication.     DVT Prophylaxis: Pneumatic Compression Devices  Code Status: Full Code     Disposition: Expected discharge in 2 days if bleeding stopped.       Darrion Moore MD  Text Page    Interval History   Patient is feeling tired.  Still with nausea, no vomiting.  No abdominal pain. Stool liquid and dark black/green color.  No bright red blood or clots.  No short of breath or chest pain.      -Data reviewed today: I reviewed all new labs and imaging results over the last 24 hours. I personally reviewed no images or EKG's today.    Physical Exam   Temp: 99.5  F (37.5  C) Temp src: Oral BP: 116/56 Pulse: 108 Heart Rate: 101 Resp: 18 SpO2: 96 % O2 Device: None (Room air) Oxygen Delivery: 4 LPM  Vitals:    02/20/18 1141 02/21/18 0600   Weight: 99.8 kg (220 lb) 110 kg (242 lb 9.6 oz)     Vital Signs with Ranges  Temp:  [97.6  F (36.4  C)-100.4  F (38  C)] 99.5  F (37.5  C)  Pulse:  [108] 108  Heart Rate:  [101-116] 101  Resp:  [12-25] 18  BP: (101-137)/(47-81) 116/56  SpO2:  [83 %-100 %] 96 %  I/O last 3 completed shifts:  In: 1639 [I.V.:1339]  Out: 950 [Urine:750; Stool:200]    Constitutional: Awake, alert, cooperative, no apparent distress   Respiratory: Clear to auscultation bilaterally, no crackles or wheezing   Cardiovascular: Regular rate and rhythm, normal S1 and S2, and no murmur noted   Abdomen: Normal bowel sounds, soft, non-distended, non-tender   Skin: No rashes, no cyanosis, dry to touch   Neuro: Alert and oriented x3   Extremities: No edema, normal range of motion   Other(s):        All other systems: Negative       Medications     - MEDICATION INSTRUCTIONS -       - MEDICATION INSTRUCTIONS -       - MEDICATION INSTRUCTIONS -       sodium chloride 100 mL/hr at 02/22/18 0200       pantoprazole (PROTONIX) IV  40 mg Intravenous Q12H     sodium chloride (PF)  3 mL Intracatheter Q8H     pantoprazole  40 mg Oral BID AC     anastrozole  1 mg Oral Daily     citalopram  20 mg Oral Daily     levothyroxine   100 mcg Oral Daily     simvastatin  40 mg Oral At Bedtime       Data     Recent Labs  Lab 02/22/18  0558 02/22/18  0206 02/21/18  1947  02/21/18  0706  02/20/18  1116   WBC  --   --   --   --   --   --  20.0*   HGB 6.6* 7.2* 6.8*  < > 7.6*  < > 11.3*   MCV  --   --   --   --   --   --  85   PLT  --   --   --   --   --   --  280   *  --   --   --  146*  --  140   POTASSIUM 4.1  --   --   --  4.7  --  4.9   CHLORIDE 115*  --   --   --  116*  --  109   CO2 26  --   --   --  28  --  20   BUN 26  --   --   --  33*  --  56*   CR 0.59  --   --   --  0.72  --  0.78   ANIONGAP 4  --   --   --  2*  --  11   VIRI 7.5*  --   --   --  7.9*  --  9.3   *  --   --   --  117*  --  206*   ALBUMIN  --   --   --   --   --   --  3.4   PROTTOTAL  --   --   --   --   --   --  7.3   BILITOTAL  --   --   --   --   --   --  0.4   ALKPHOS  --   --   --   --   --   --  64   ALT  --   --   --   --   --   --  19   AST  --   --   --   --   --   --  13   < > = values in this interval not displayed.    Imaging:  No results found for this or any previous visit (from the past 24 hour(s)).

## 2018-02-22 NOTE — PROGRESS NOTES
She is nauseated this morning, but,no vomiting. Still passing melenotic stool. Getting transfused this morning because her hgb dropped from yesterday. Not interested in food yet. Abdominal exam is negative this morning. We discussed the possibility that she is losing blood from elsewhere in her gut. At this time, I don't think more testing is necessary. Will check biopsies today.

## 2018-02-22 NOTE — PLAN OF CARE
Problem: Patient Care Overview  Goal: Plan of Care/Patient Progress Review  Outcome: No Change  Denies pain. Nausea medicated with zofran. Loose dark stool x2 overnight. Up with sba to BSC. HgB 6.8 on evenings, 1unit PRBC given, recheck 7.2. AM check 6.6- will begin another unit PRBC. GI to revisit today. MD to bedside this am to review POC. Pt verbalizes understanding. No other events or changes in condition overnight. Slept lightly between cares.

## 2018-02-22 NOTE — PROGRESS NOTES
02/21/18 2036   Significant Event   Significant Event Other (see comments)  (Critical hemoglobin of 6.8. )       Conditional orders for blood noted. Writer notified MD of critical value and confirmation that 1 unit of PRBCs to be given.

## 2018-02-22 NOTE — PROVIDER NOTIFICATION
Pagedipika DAVILA hGb result 6.6. Made pt NPO and requested conditional PRBC. MD to bedside at 0705 to see patient.

## 2018-02-22 NOTE — PLAN OF CARE
Problem: Patient Care Overview  Goal: Plan of Care/Patient Progress Review  VS: T max of 100.4, Tylenol given when temp was 99.7. C/o headache, ice pack and tylenol with no relief.   GI: BS hypoactive, passing flatus. Mild nausea, no PRNs given. BM x1, loose/black/tarry. Sample sent, negative C.diff. Enteric panel pending. Clear liquids, tolerating only water.   LS: Clear. Denies shortness of breath.   : Voided x1 post sarkar removal.   Neuro: Alert and oriented x4. CMS intact. C/o dizziness with ambulation.   Mobility: Up x1 with assist of 1-2 and gait belt. Dizziness noted, BSC to be used until more stable.   Disposition: TBD. Currently receiving 1 unit PRBCs.

## 2018-02-23 LAB
APTT PPP: 29 SEC (ref 22–37)
BLD PROD TYP BPU: NORMAL
BLD UNIT ID BPU: 0
BLOOD PRODUCT CODE: NORMAL
BPU ID: NORMAL
HGB BLD-MCNC: 6.1 G/DL (ref 11.7–15.7)
HGB BLD-MCNC: 6.7 G/DL (ref 11.7–15.7)
HGB BLD-MCNC: 6.9 G/DL (ref 11.7–15.7)
HGB BLD-MCNC: 8.1 G/DL (ref 11.7–15.7)
INR PPP: 1.25 (ref 0.86–1.14)
PLATELET # BLD AUTO: 154 10E9/L (ref 150–450)
TRANSFUSION STATUS PATIENT QL: NORMAL

## 2018-02-23 PROCEDURE — 85610 PROTHROMBIN TIME: CPT | Performed by: INTERNAL MEDICINE

## 2018-02-23 PROCEDURE — 85730 THROMBOPLASTIN TIME PARTIAL: CPT | Performed by: INTERNAL MEDICINE

## 2018-02-23 PROCEDURE — 25000132 ZZH RX MED GY IP 250 OP 250 PS 637: Mod: GY | Performed by: INTERNAL MEDICINE

## 2018-02-23 PROCEDURE — 25000128 H RX IP 250 OP 636: Performed by: INTERNAL MEDICINE

## 2018-02-23 PROCEDURE — 99232 SBSQ HOSP IP/OBS MODERATE 35: CPT | Performed by: INTERNAL MEDICINE

## 2018-02-23 PROCEDURE — 85018 HEMOGLOBIN: CPT | Performed by: INTERNAL MEDICINE

## 2018-02-23 PROCEDURE — 85049 AUTOMATED PLATELET COUNT: CPT | Performed by: INTERNAL MEDICINE

## 2018-02-23 PROCEDURE — 36415 COLL VENOUS BLD VENIPUNCTURE: CPT | Performed by: INTERNAL MEDICINE

## 2018-02-23 PROCEDURE — 25000125 ZZHC RX 250: Performed by: INTERNAL MEDICINE

## 2018-02-23 PROCEDURE — A9270 NON-COVERED ITEM OR SERVICE: HCPCS | Mod: GY | Performed by: INTERNAL MEDICINE

## 2018-02-23 PROCEDURE — P9016 RBC LEUKOCYTES REDUCED: HCPCS | Performed by: INTERNAL MEDICINE

## 2018-02-23 PROCEDURE — 12000000 ZZH R&B MED SURG/OB

## 2018-02-23 RX ORDER — PANTOPRAZOLE SODIUM 40 MG/1
40 TABLET, DELAYED RELEASE ORAL
Status: DISCONTINUED | OUTPATIENT
Start: 2018-02-23 | End: 2018-02-28 | Stop reason: HOSPADM

## 2018-02-23 RX ADMIN — SODIUM CHLORIDE: 9 INJECTION, SOLUTION INTRAVENOUS at 04:05

## 2018-02-23 RX ADMIN — LEVOTHYROXINE SODIUM 100 MCG: 100 TABLET ORAL at 08:23

## 2018-02-23 RX ADMIN — PANTOPRAZOLE SODIUM 40 MG: 40 INJECTION, POWDER, FOR SOLUTION INTRAVENOUS at 08:25

## 2018-02-23 RX ADMIN — ANASTROZOLE 1 MG: 1 TABLET, COATED ORAL at 08:23

## 2018-02-23 RX ADMIN — SODIUM CHLORIDE: 9 INJECTION, SOLUTION INTRAVENOUS at 13:14

## 2018-02-23 RX ADMIN — ONDANSETRON 4 MG: 4 TABLET, ORALLY DISINTEGRATING ORAL at 07:53

## 2018-02-23 RX ADMIN — CITALOPRAM HYDROBROMIDE 20 MG: 20 TABLET ORAL at 08:24

## 2018-02-23 RX ADMIN — PANTOPRAZOLE SODIUM 40 MG: 40 TABLET, DELAYED RELEASE ORAL at 15:56

## 2018-02-23 RX ADMIN — SIMVASTATIN 40 MG: 40 TABLET, FILM COATED ORAL at 21:27

## 2018-02-23 ASSESSMENT — ACTIVITIES OF DAILY LIVING (ADL)
ADLS_ACUITY_SCORE: 11

## 2018-02-23 ASSESSMENT — PAIN DESCRIPTION - DESCRIPTORS: DESCRIPTORS: HEADACHE

## 2018-02-23 NOTE — PLAN OF CARE
Problem: Gastrointestinal Bleeding (Adult)  Goal: Signs and Symptoms of Listed Potential Problems Will be Absent, Minimized or Managed (Gastrointestinal Bleeding)  Signs and symptoms of listed potential problems will be absent, minimized or managed by discharge/transition of care (reference Gastrointestinal Bleeding (Adult) CPG).  VS: BP soft at end of this shift. MD notified. Tachycardia persists but is worsening into 120s, MD notified. T max of 100.4. C/o headache that was not relieved with tylenol, ice pack applied. Pt noted to have headaches as hemoglobin drops.   GI: BS active, passing flatus. Poor PO intake. Nausea with activity, no PRNs required. Loose, tarry stool x1 tonight.   LS: Clear, denies shortness of breath.   : Voiding without issue.   Neuro: Alert and oriented x4. CMS intact. Dizziness noted with activity.   Mobility: Up with assist of 1 to commode.   Disposition: TBD.

## 2018-02-23 NOTE — PROVIDER NOTIFICATION
MD paged, concern that pt has active bleed. Pt just had a 200 ml red stool that was liquid. HGB was 6.1 last night and pt received 1 unit of blood. Lab was called to check on status of AM HGB, they are stating that someone will be up shortly to draw HGB.

## 2018-02-23 NOTE — PROGRESS NOTES
[facility]  Hospitalist Progress Note  Patient Name: Michelle Pedersen    MRN: [id number]  Provider: Reymundo Gomez MD  02/23/18    Initial presenting complaint/issue to hospital (Diagnosis):  Diarrhea changing to black stools         Assessment and Plan:      Summary:  Michelle Pedersen is a 67 year old female with history of hypothyroidism, hypercholesterolemia, depression, breast cancer, unremarkable colonoscopy 4 years ago, and what looks like neurofibromatosis.  She presented to the emergency department o 2/20/18 for evaluation of 2 days of diarrhea that it turned black.  Emergency department evaluation showed tachycardia but otherwise stable vital signs.  Stool was guaiac positive.  White blood cell count was elevated at 20,000.  Lactic acid was elevated at 4.2.  Hemoglobin was somewhat low at 11.3.  Urinalysis was unremarkable.  CT of abdomen and pelvis suggested some left-sided colon wall thickening.  She was admitted to the hospital with concern about possible upper GI bleed.  She developed significant anemia with hemoglobin as low as 6.1 for which she received several blood transfusions (4 units so far).  She was seen by gastroenterology and had upper endoscopy which showed no obvious source of bleeding or peptic ulcer disease.  She was being monitored and on 2/23/18 she had an episode of frankly bloody stool.  Hemoglobin had risen to 6.9 from 6.1 the evening before.  At this point, ischemic colitis was suspected given rodrigo blood, lactic acidosis on presentation, leukocytosis, and CT findings of left-sided colon wall thickening in the distal transverse colon and proximal descending colon.    Problem list:        1.  Significant GI bleed.  With the appearance of red blood in stool this morning I am more suspicious of a lower source of bleeding.  Overall, her constellation of symptoms, lab findings, and imaging findings (black stools, now bloody stools, lactic acidosis, anemia, leukocytosis, and colon  wall thickening near the splenic flexure) suggests ischemic colitis.  I discussed this with Dr. York and he would consider colonoscopy if bleeding persisted.  Michelle, however, really wishes to avoid this as her last colonoscopy prep was problematic.  Continue symptomatic cares for now.  Transfuse as needed.  I discussed this change in diagnosis and plan with Michelle's family at length this morning.         2.  Hypothyroidism.  Continue prior to admission Synthroid    3.  History of breast cancer.  Continue prior to admission Arimidex      4.  Depression.  Continue prior to admission Celexa    5.  Deconditioning.  Physical therapy consult.        DVT Prophylaxis: Pneumatic Compression Devices  Code Status: Full Code  Disposition: I suspect she will be here for at least 2 more days        Interval History:      Patient had a frankly bloody stool this morning.  She feels okay, may be even better.  She is somewhat weak.                  Physical Exam:      Last Vital Signs:  /56  Pulse 105  Temp 98.7  F (37.1  C) (Oral)  Resp 18  Wt 110 kg (242 lb 9.6 oz)  SpO2 97%  BMI 44.37 kg/m2    Intake/Output Summary (Last 24 hours) at 02/23/18 1635  Last data filed at 02/23/18 1556   Gross per 24 hour   Intake             1674 ml   Output              500 ml   Net             1174 ml       GENERAL:  Comfortable. Cooperative.  PSYCH: pleasant, oriented, No acute distress.  EYES: PERRLA, Normal conjunctiva.  HEART:  Regular rate and rhythm. No JVD. Pulses normal. No edema.  LUNGS:  Clear to auscultation, normal Respiratory effort.  ABDOMEN:  Soft, no hepatosplenomegaly, normal bowel sounds.  EXTREMETIES: No clubbing, cyanosis or ischemia  SKIN:  Dry to touch, No rash.           Medications:      All current medications were reviewed.         Data:      All new lab and imaging data was reviewed.   Labs:    Recent Labs  Lab 02/20/18  1211 02/20/18  1155   CULT No growth after 3 days No growth after 3 days          Lab  Results   Component Value Date     02/22/2018     02/21/2018     02/20/2018    Lab Results   Component Value Date    CHLORIDE 115 02/22/2018    CHLORIDE 116 02/21/2018    CHLORIDE 109 02/20/2018    Lab Results   Component Value Date    BUN 26 02/22/2018    BUN 33 02/21/2018    BUN 56 02/20/2018      Lab Results   Component Value Date    POTASSIUM 4.1 02/22/2018    POTASSIUM 4.7 02/21/2018    POTASSIUM 4.9 02/20/2018    Lab Results   Component Value Date    CO2 26 02/22/2018    CO2 28 02/21/2018    CO2 20 02/20/2018    Lab Results   Component Value Date    CR 0.59 02/22/2018    CR 0.72 02/21/2018    CR 0.78 02/20/2018          Recent Labs  Lab 02/23/18  1403 02/23/18  0823 02/22/18  2358  02/20/18  1116   WBC  --   --   --   --  20.0*   HGB 8.1* 6.9* 6.1*  < > 11.3*   HCT  --   --   --   --  38.0   MCV  --   --   --   --  85   PLT  --  154  --   --  280   < > = values in this interval not displayed.

## 2018-02-23 NOTE — PROGRESS NOTES
I talked with Dr. Gomez about doing a colonoscopy on her to look for ischemic colitis. When I presented that thought to the patient, she told me she was not interested in doing it. She had a lot of trouble with the prep the last time she was done 4 years ago when she was feeling well. I looked at the stool she passed this AM and was not impressed with the amount of blood in the bag. So, I will not press the issue of getting a colonoscopy now. The biopsy was negative for H. Pylori. She is on her 4th unit of blood now. Will follow hgbs.

## 2018-02-23 NOTE — PLAN OF CARE
Problem: Patient Care Overview  Goal: Plan of Care/Patient Progress Review  Outcome: No Change  Patient complaining of headache throughout shift. Pain managed with ice packs, patient did not want medication for pain during shift. Patient had blood transfusion this am, tolerated well. Patient had increased temperature during infusion, this is normal for patient. Daughter spoke with nurse manager regarding treatment plan and number was given for patient relations. MD spoke with daughter and patient regarding care plan. Daughter pleased with care provided by nursing staff. Daughter will be calling regarding hemoglobin levels after lab draws. Will update oncoming shift. Requested MD to update patient's code status. Patient had episode of decreased blood pressure and increased heart rate when patient went from bed to chair. Patient complained of weakness when sitting up, patient layed back down.

## 2018-02-23 NOTE — CONSULTS
"CLINICAL NUTRITION SERVICES  -  ASSESSMENT NOTE      Malnutrition: Does not meet criteria at this time.          REASON FOR ASSESSMENT  Michelle Pedersen is a 67 year old female seen by Registered Dietitian for Nutrition Jorge <3.      NUTRITION HISTORY  - Information obtained from patient, , and chart.    - Patient with a h/o breast CA, depression, previous GI bleed 2/2 duodenal ulcer.    - Diarrhea x 1-2 days PTA.  Some nausea without vomiting, but patient reports this was not impacting oral intakes.  Prior to being admitted patient states she was eating at baseline.    - Regular diet at baseline with meals TID.  \"I enjoy cooking\".  Does most of the grocery shopping and meal preparation.  Meals/foods consumed daily are highly variable - \"there's nothing I don't like\".    - NKFA.      CURRENT NUTRITION ORDERS  Diet Order:     Clears  Ensure clear BID between meals    Current Intake/Tolerance:  NPO/clears x 4 days since admit.  Hesitant to take liquids orally but also with nausea.  Patient reports she is not fond of her Ensure clear supplement but verbalizes \"I know I need it\".  Consuming 2 daily as ordered - either flavor \"ok\".       PHYSICAL FINDINGS  Observed  No fat loss observed, suspect some degree of underlying at least mild muscle loss masked by adiposity though patient denied full physical exam with this writer  Obtained from Chart/Interdisciplinary Team  BM this AM    ANTHROPOMETRICS  Height: 5' 2\"  Weight: 110 kg (242#)  Body mass index is 44.37 kg/(m^2).  Weight Status:  Obesity Grade III BMI >40  IBW: 50 kg (110#)  % IBW: 220%  Weight History:  Wt Readings from Last 10 Encounters:   02/21/18 110 kg (242 lb 9.6 oz)   10/26/11 111.1 kg (245 lb)   - Per care everywhere, office visit wt of 242# from 11/06/2018.  Patient herself denies wt changes PTA.    LABS  Labs reviewed    MEDICATIONS  Medications reviewed    Dosing Weight 65 kg - adjusted weight     ASSESSED NUTRITION NEEDS PER APPROVED PRACTICE " GUIDELINES:  Estimated Energy Needs: 7691-0942 kcals (25-30 Kcal/Kg)  Justification: obese  Estimated Protein Needs:  grams protein (1.5-2.0 g pro/Kg)  Justification: preservation of lean body mass (1.0 g/kg actual body weight)  Estimated Fluid Needs: 5329-8416 mL (1 mL/Kcal)  Justification: maintenance and per provider pending fluid status    MALNUTRITION:  % Weight Loss:  None noted  % Intake:  Decreased intake does not meet criteria for malnutrition (<50% x 4 days)  Subcutaneous Fat Loss:  None observed  Muscle Loss:  None observed though suspect masking to some degree  Fluid Retention:  None noted    Malnutrition Diagnosis: Patient does not meet two of the above criteria necessary for diagnosing malnutrition    NUTRITION DIAGNOSIS:  Inadequate oral intake related to restricted diet order 2/2 GI bleed, nausea, oral intake hesitency as evidenced by NPO/clears meeting <50% needs x 4 days since admission.       NUTRITION INTERVENTIONS  Recommendations / Nutrition Prescription  Diet per MD.     Continue Ensure clear supplement BID.  Consider increasing to TID if remains restricted to clears over the next 24-48 hrs.       Implementation  Nutrition education: Provided education on diet per MD with continuation of Ensure clear given limited options on current diet.  Recommendations for small/frequent meals and ability to request anti-emetics prior to meals/supplements to combat nausea.        Nutrition Goals  Diet order advancement past clears w/in 48-72 hrs.       MONITORING AND EVALUATION:  Progress towards goals will be monitored and evaluated per protocol and Practice Guidelines        Jeanne Oneal RD, LD  Clinical Dietitian  3rd floor/ICU: [phone]  All other floors: [phone]  Weekend/holiday: [phone]

## 2018-02-23 NOTE — PLAN OF CARE
Problem: Patient Care Overview  Goal: Plan of Care/Patient Progress Review  Outcome: No Change  Pt remains hospitalized with GI bleed.   Tachy, low grade temps and soft BP's this shift.   1 Unit of PRBC's this shift for a Hgb 6.1, recheck in AM.   Daughter requesting additional labs INR, PTT, and Platelets, left sticky note for md and passed along to oncoming RN.  On clears, only took in ice chips.   Up with assist x 1-2, only up to BSC. Incont loose tarry stool x 1 this shift.   PIV NS 50/hr. Continues IV Protonix.   GI following.   Update given to pts daughter Tatyana @ 0100 regarding Hgb.   Will continue to monitor.

## 2018-02-24 ENCOUNTER — APPOINTMENT (OUTPATIENT)
Dept: PHYSICAL THERAPY | Facility: CLINIC | Age: 68
DRG: 394 | End: 2018-02-24
Attending: INTERNAL MEDICINE
Payer: MEDICARE

## 2018-02-24 LAB
ABO + RH BLD: NORMAL
ABO + RH BLD: NORMAL
BLD GP AB SCN SERPL QL: NORMAL
BLD PROD TYP BPU: NORMAL
BLD UNIT ID BPU: 0
BLD UNIT ID BPU: 0
BLOOD BANK CMNT PATIENT-IMP: NORMAL
BLOOD PRODUCT CODE: NORMAL
BLOOD PRODUCT CODE: NORMAL
BPU ID: NORMAL
BPU ID: NORMAL
HGB BLD-MCNC: 6.6 G/DL (ref 11.7–15.7)
HGB BLD-MCNC: 7.4 G/DL (ref 11.7–15.7)
HGB BLD-MCNC: NORMAL G/DL (ref 11.7–15.7)
NUM BPU REQUESTED: 7
SPECIMEN EXP DATE BLD: NORMAL
TRANSFUSION STATUS PATIENT QL: NORMAL

## 2018-02-24 PROCEDURE — 36569 INSJ PICC 5 YR+ W/O IMAGING: CPT

## 2018-02-24 PROCEDURE — 99232 SBSQ HOSP IP/OBS MODERATE 35: CPT | Performed by: INTERNAL MEDICINE

## 2018-02-24 PROCEDURE — 86900 BLOOD TYPING SEROLOGIC ABO: CPT | Performed by: INTERNAL MEDICINE

## 2018-02-24 PROCEDURE — 86901 BLOOD TYPING SEROLOGIC RH(D): CPT | Performed by: INTERNAL MEDICINE

## 2018-02-24 PROCEDURE — 97161 PT EVAL LOW COMPLEX 20 MIN: CPT | Mod: GP | Performed by: PHYSICAL THERAPIST

## 2018-02-24 PROCEDURE — P9016 RBC LEUKOCYTES REDUCED: HCPCS | Performed by: INTERNAL MEDICINE

## 2018-02-24 PROCEDURE — 97110 THERAPEUTIC EXERCISES: CPT | Mod: GP | Performed by: PHYSICAL THERAPIST

## 2018-02-24 PROCEDURE — 12000000 ZZH R&B MED SURG/OB

## 2018-02-24 PROCEDURE — A9270 NON-COVERED ITEM OR SERVICE: HCPCS | Mod: GY | Performed by: INTERNAL MEDICINE

## 2018-02-24 PROCEDURE — 85018 HEMOGLOBIN: CPT | Performed by: INTERNAL MEDICINE

## 2018-02-24 PROCEDURE — 97530 THERAPEUTIC ACTIVITIES: CPT | Mod: GP | Performed by: PHYSICAL THERAPIST

## 2018-02-24 PROCEDURE — 25000132 ZZH RX MED GY IP 250 OP 250 PS 637: Mod: GY | Performed by: INTERNAL MEDICINE

## 2018-02-24 PROCEDURE — 36415 COLL VENOUS BLD VENIPUNCTURE: CPT | Performed by: INTERNAL MEDICINE

## 2018-02-24 PROCEDURE — 40000193 ZZH STATISTIC PT WARD VISIT: Performed by: PHYSICAL THERAPIST

## 2018-02-24 PROCEDURE — 25000128 H RX IP 250 OP 636: Performed by: INTERNAL MEDICINE

## 2018-02-24 PROCEDURE — 86850 RBC ANTIBODY SCREEN: CPT | Performed by: INTERNAL MEDICINE

## 2018-02-24 PROCEDURE — 27210509 ZZH TRAY VALVED DOUBLE LUMEN

## 2018-02-24 RX ORDER — HEPARIN SODIUM,PORCINE 10 UNIT/ML
2-5 VIAL (ML) INTRAVENOUS
Status: DISCONTINUED | OUTPATIENT
Start: 2018-02-24 | End: 2018-02-24

## 2018-02-24 RX ORDER — LIDOCAINE 40 MG/G
CREAM TOPICAL
Status: DISCONTINUED | OUTPATIENT
Start: 2018-02-24 | End: 2018-02-24

## 2018-02-24 RX ADMIN — PANTOPRAZOLE SODIUM 40 MG: 40 TABLET, DELAYED RELEASE ORAL at 15:44

## 2018-02-24 RX ADMIN — ACETAMINOPHEN 650 MG: 325 TABLET, FILM COATED ORAL at 09:51

## 2018-02-24 RX ADMIN — LEVOTHYROXINE SODIUM 100 MCG: 100 TABLET ORAL at 08:17

## 2018-02-24 RX ADMIN — SODIUM CHLORIDE: 9 INJECTION, SOLUTION INTRAVENOUS at 19:04

## 2018-02-24 RX ADMIN — PANTOPRAZOLE SODIUM 40 MG: 40 TABLET, DELAYED RELEASE ORAL at 06:37

## 2018-02-24 RX ADMIN — SODIUM CHLORIDE: 9 INJECTION, SOLUTION INTRAVENOUS at 04:20

## 2018-02-24 RX ADMIN — ACETAMINOPHEN 650 MG: 325 TABLET, FILM COATED ORAL at 20:27

## 2018-02-24 RX ADMIN — ACETAMINOPHEN 650 MG: 325 TABLET, FILM COATED ORAL at 15:44

## 2018-02-24 RX ADMIN — CITALOPRAM HYDROBROMIDE 20 MG: 20 TABLET ORAL at 08:17

## 2018-02-24 RX ADMIN — SIMVASTATIN 40 MG: 40 TABLET, FILM COATED ORAL at 21:25

## 2018-02-24 RX ADMIN — ANASTROZOLE 1 MG: 1 TABLET, COATED ORAL at 08:17

## 2018-02-24 ASSESSMENT — ACTIVITIES OF DAILY LIVING (ADL)
ADLS_ACUITY_SCORE: 12

## 2018-02-24 ASSESSMENT — PAIN DESCRIPTION - DESCRIPTORS: DESCRIPTORS: HEADACHE

## 2018-02-24 NOTE — PLAN OF CARE
Problem: Patient Care Overview  Goal: Plan of Care/Patient Progress Review  PT: Order received; Initial evaluation completed and treatment initiated. Patient admitted with GI bleed symptoms and has a Hgb of 7.4 today. No current plan for a blood transfusion. Prior to admit patient reports independence with all mobility without an assistive device and no recent falls. Lives with her spouse in a home with only 1 step to enter. Spouse uses a cane for mobility and unable to provide much physical assist.  Discharge Planner PT   Patient plan for discharge: return home with spouse  Current status: Patient currently limited by fatigue, functional weakness, and low Hgb of 7.4; able to perform bed mobility with SBA and heavy use of bedrail; CGA/SBA for transfer to bedside chair and taking a few steps;  BP stable 115/55 in supine and 120/54 with activity. No significant unsteadiness noted.  Barriers to return to prior living situation: current weakness; spouse unable to provide physical assist  Recommendations for discharge: Likely patient will be able to return home pending a stable Hgb.Patient would need to be modified independent with mobility; Spouse unable to provide physical assist; May need home PT.  Rationale for recommendations: functional weakness; decreased activity tolerance; impaired independence with functional mobility       Entered by: Jania Enamorado 02/24/2018 12:16 PM

## 2018-02-24 NOTE — PROGRESS NOTES
She continues to bleed and requires more blood. She is more amenable to getting a colonoscopy now. She will need to get all of the old blood out. I will start a prep tomorrow if her hgb drops overnight. Her abdominal exam is benign now. She is getting 2 units of prbc now.

## 2018-02-24 NOTE — PROGRESS NOTES
Spoke with lab on the phone, type and crossmatch is good until midnight; if further transfusions need to occur after that time, we will need a new type and cross.

## 2018-02-24 NOTE — PLAN OF CARE
Problem: Patient Care Overview  Goal: Plan of Care/Patient Progress Review  Outcome: No Change  Pt up with one to two assist, depending on level of lightheadedness. Pt has had two, watery black stools this shift. Last HGB check was 8.1, recheck scheduled for 2200. Pt has had a poor appetite but denies any nausea this shift. Pt did drink some ice water and a clear boost drink. Denies any pain this shift, states that she is feeling better than earlier this morning.

## 2018-02-24 NOTE — PROGRESS NOTES
02/24/18 1121   Quick Adds   Type of Visit Initial PT Evaluation   Living Environment   Lives With spouse   Living Arrangements house   Home Accessibility stairs to enter home;stairs within home   Number of Stairs to Enter Home 1  (no rail)   Number of Stairs Within Home 14  (stairs to basement but doesn't have to access)   Transportation Available car;family or friend will provide  (patient normally drives)   Self-Care   Usual Activity Tolerance good   Current Activity Tolerance fair   Regular Exercise no   Equipment Currently Used at Home none  (has a cane, walker, and crutches)   Activity/Exercise/Self-Care Comment normally independent with mobility when Hgb stable   Functional Level Prior   Ambulation 0-->independent   Transferring 0-->independent   Toileting 0-->independent   Bathing 0-->independent   Dressing 0-->independent   Eating 0-->independent   Communication 0-->understands/communicates without difficulty   Swallowing 0-->swallows foods/liquids without difficulty   Cognition 0 - no cognition issues reported   Fall history within last six months no   Number of times patient has fallen within last six months (last fall in July)   Which of the above functional risks had a recent onset or change? ambulation;transferring   Prior Functional Level Comment patient normally independent without an assistive device when Hgb stable   General Information   Onset of Illness/Injury or Date of Surgery - Date 02/20/18   Referring Physician Reymundo Gomez MD   Patient/Family Goals Statement return home; spouse uses a cane; unsteady on feet   Pertinent History of Current Problem (include personal factors and/or comorbidities that impact the POC) per chart: Michelle Pedersen is a 67 year old female with history of hypothyroidism, hypercholesterolemia, depression, breast cancer, unremarkable colonoscopy 4 years ago, and what looks like neurofibromatosis.  She presented to the emergency department o 2/20/18 for evaluation  of 2 days of diarrhea that it turned black.  Emergency department evaluation showed tachycardia but otherwise stable vital signs.  Stool was guaiac positive; Unidentified source of current GI bleed; see medical record for further information   Precautions/Limitations fall precautions   General Observations Patient in bed; spouse present for eval; Hgb 7.4   General Info Comments /55 in supine; HR 96   Cognitive Status Examination   Orientation orientation to person, place and time   Level of Consciousness alert   Follows Commands and Answers Questions 100% of the time   Personal Safety and Judgment intact   Memory intact   Pain Assessment   Patient Currently in Pain No   Posture    Posture Comments WFL   Range of Motion (ROM)   ROM Comment limited hip flexion but others appear WFL   Strength   Strength Comments functional weakness; limited by GI bleed with Hgb 7.4   Bed Mobility   Bed Mobility Comments SBA and patient heavy use of bedrail; no dizziness;    Transfer Skills   Transfer Comments sit<>stand and transfer to bedside chair with CGA/SBA of 1 and no device   Gait   Gait Comments Patient able to ambulate from bed to bedside chair; had walked earlier to bathroom with nurses   Balance   Balance Comments intact static balance and sitting balance; SBA for short distance mobility   Sensory Examination   Sensory Perception Comments intact per patient report   General Therapy Interventions   Planned Therapy Interventions gait training;bed mobility training;strengthening;transfer training;progressive activity/exercise   Clinical Impression   Criteria for Skilled Therapeutic Intervention yes, treatment indicated   PT Diagnosis impaired gait/transfers; weakness   Influenced by the following impairments functional weakness; decreased activity tolerance; low Hgb   Functional limitations due to impairments impaired independence with functional mobility   Clinical Presentation Evolving/Changing   Clinical Presentation  "Rationale GI bleed; low Hgb; changing medical issues; CGA for mobility; spouse unable to provide much assist   Clinical Decision Making (Complexity) Low complexity   Therapy Frequency` daily   Predicted Duration of Therapy Intervention (days/wks) 4 days   Anticipated Equipment Needs at Discharge front wheeled walker  (to be determined)   Anticipated Discharge Disposition Home with Assist;Home with Home Therapy   Risk & Benefits of therapy have been explained Yes   Patient, Family & other staff in agreement with plan of care Yes   [facility] TM \"6 Clicks\"   2016, Trustees of [facility] [place], under license to [employer].  All rights reserved.   6 Clicks Short Forms Basic Mobility Inpatient Short Form   [facility]  \"6 Clicks\" V.2 Basic Mobility Inpatient Short Form   1. Turning from your back to your side while in a flat bed without using bedrails? 4 - None   2. Moving from lying on your back to sitting on the side of a flat bed without using bedrails? 3 - A Little   3. Moving to and from a bed to a chair (including a wheelchair)? 3 - A Little   4. Standing up from a chair using your arms (e.g., wheelchair, or bedside chair)? 3 - A Little   5. To walk in hospital room? 3 - A Little   6. Climbing 3-5 steps with a railing? 2 - A Lot   Basic Mobility Raw Score (Score out of 24.Lower scores equate to lower levels of function) 18   Total Evaluation Time   Total Evaluation Time (Minutes) 10     "

## 2018-02-24 NOTE — PROVIDER NOTIFICATION
Text page sent to Admitting Hospitalist:    New lab, critical value: Hgb 6.7. Standing orders for transfusion, will transfuse.    Call back number provided, awaiting new orders/response if necessary. Continue to monitor.

## 2018-02-24 NOTE — PROVIDER NOTIFICATION
Prior to the start of the PICC procedure and with procedural staff participation, I verbally confirmed the patient s identity using two indicators, relevant allergies, that the procedure was appropriate and matched the consent or emergent situation, and that the correct equipment/implants were available. Immediately prior to starting the procedure I conducted the Time Out with the procedural staff and re-confirmed the patient s name, procedure, and site/side. (The Joint Commission universal protocol was followed.)  Yes    Sedation (Moderate or Deep): None    Time out performed with patients primary nurse

## 2018-02-24 NOTE — PROGRESS NOTES
02/23/18 2244   Significant Event   Significant Event Other (see comments)  (Hgb 6.7)   Text page sent to Admitting hospitalist, see corresponding note

## 2018-02-24 NOTE — PROVIDER NOTIFICATION
Spoke with NATHAN DAVILA to transfuse with low grade temp of 99.4, MD EVANS with administration of tylenol at start of transfusion.

## 2018-02-24 NOTE — PROGRESS NOTES
[facility]  Hospitalist Progress Note  Patient Name: Michelle Pedersen    MRN: [id number]  Provider: Reymundo Gomez MD  02/24/18    Initial presenting complaint/issue to hospital (Diagnosis): diarrhea and black stools.         Assessment and Plan:      Summary:  Michelle Pedersen is a 67 year old female with history of hypothyroidism, hypercholesterolemia, depression, breast cancer, unremarkable colonoscopy 4 years ago, and what looks like neurofibromatosis.  She presented to the emergency department o 2/20/18 for evaluation of 2 days of diarrhea that it turned black.  Emergency department evaluation showed tachycardia but otherwise stable vital signs.  Stool was guaiac positive.  White blood cell count was elevated at 20,000.  Lactic acid was elevated at 4.2.  Hemoglobin was somewhat low at 11.3.  Urinalysis was unremarkable.  CT of abdomen and pelvis suggested some left-sided colon wall thickening.  She was admitted to the hospital with concern about possible upper GI bleed.  She developed significant anemia with hemoglobin as low as 6.1 for which she received several blood transfusions (4 units so far).  She was seen by gastroenterology and had upper endoscopy which showed no obvious source of bleeding or peptic ulcer disease.  She was being monitored and on 2/23/18 she had an episode of frankly bloody stool.  Hemoglobin had risen to 6.9 from 6.1 the evening before.  At this point, ischemic colitis was suspected given rodrigo blood, lactic acidosis on presentation, leukocytosis, and CT findings of left-sided colon wall thickening in the distal transverse colon and proximal descending colon.     Problem list:        1.  Significant GI bleed.  With the appearance of red blood in stool this morning I am more suspicious of a lower source of bleeding.  Overall, her constellation of symptoms, lab findings, and imaging findings (black stools, now bloody stools, lactic acidosis, anemia, leukocytosis, and colon wall  thickening near the splenic flexure) suggests ischemic colitis.  I discussed this with Dr. York and he would consider colonoscopy if bleeding persisted.  Michelle, however, really wishes to avoid this as her last colonoscopy prep was problematic.  Continue symptomatic cares for now.  Transfuse as needed.  Continue q 8 hour hemoglobins.  New type and screen. Advance diet to full liquids.  Patient would be willing to do colonoscopy if bleeding does not stop in the next few days.         2.  Hypothyroidism.  Continue prior to admission Synthroid     3.  History of breast cancer.  Continue prior to admission Arimidex      4.  Depression.  Continue prior to admission Celexa     5.  Deconditioning.  Physical therapy consult appreciated.         DVT Prophylaxis: Pneumatic Compression Devices  Code Status: Full Code  Disposition: I suspect she will be here for at least 2 more days        Interval History:      Got blood transfusion again overnight.  Feels a little stronger today. Still with dark and runny stools.                   Physical Exam:      Last Vital Signs:  /53 (BP Location: Left arm)  Pulse 107  Temp 99.3  F (37.4  C) (Oral)  Resp 18  Wt 111.6 kg (246 lb 1.6 oz)  SpO2 97%  BMI 45.01 kg/m2    Intake/Output Summary (Last 24 hours) at 02/24/18 1232  Last data filed at 02/24/18 1230   Gross per 24 hour   Intake             1843 ml   Output              500 ml   Net             1343 ml       GENERAL:  Comfortable. Cooperative.  PSYCH: pleasant, oriented, No acute distress.  EYES: PERRLA, Normal conjunctiva.  HEART:  Regular rate and rhythm. No JVD. Pulses normal. No edema.  LUNGS:  Clear to auscultation, normal Respiratory effort.  ABDOMEN:  Soft, no hepatosplenomegaly, normal bowel sounds.  EXTREMETIES: No clubbing, cyanosis or ischemia  SKIN:  Dry to touch, No rash. No signs of hematoma.            Medications:      All current medications were reviewed.         Data:      All new lab and imaging data  was reviewed.   Labs:       Lab Results   Component Value Date     02/22/2018     02/21/2018     02/20/2018    Lab Results   Component Value Date    CHLORIDE 115 02/22/2018    CHLORIDE 116 02/21/2018    CHLORIDE 109 02/20/2018    Lab Results   Component Value Date    BUN 26 02/22/2018    BUN 33 02/21/2018    BUN 56 02/20/2018      Lab Results   Component Value Date    POTASSIUM 4.1 02/22/2018    POTASSIUM 4.7 02/21/2018    POTASSIUM 4.9 02/20/2018    Lab Results   Component Value Date    CO2 26 02/22/2018    CO2 28 02/21/2018    CO2 20 02/20/2018    Lab Results   Component Value Date    CR 0.59 02/22/2018    CR 0.72 02/21/2018    CR 0.78 02/20/2018          Recent Labs  Lab 02/24/18  0741 02/24/18  0647 02/23/18  2230  02/23/18  0823  02/20/18  1116   WBC  --   --   --   --   --   --  20.0*   HGB 7.4* Canceled, Test credited 6.7*  < > 6.9*  < > 11.3*   HCT  --   --   --   --   --   --  38.0   MCV  --   --   --   --   --   --  85   PLT  --   --   --   --  154  --  280   < > = values in this interval not displayed.    Recent Labs  Lab 02/23/18  0823   INR 1.25*          ;

## 2018-02-24 NOTE — PLAN OF CARE
Problem: Patient Care Overview  Goal: Plan of Care/Patient Progress Review  VS /52 (BP Location: Right arm)  Pulse 104  Temp 99.3  F (37.4  C) (Oral)  Resp 18  Wt 110 kg (242 lb 9.6 oz)  SpO2 97%  BMI 44.37 kg/m2  Lung sounds Clear  O2 Room Air  Bowel sounds Active, passing gas  Tolerating clear liquid diet  IVF NS @ 75 Maintenance fluids infusing  Dressings   Tests hgb 6.7, 1 unit PRBC transfused   CMS Intact  Drains None  Activity up with SBA assist  Pain Denies  Patient/family centered care POC discussed with pt, questions answered.   Discharge plan DC once stable

## 2018-02-25 ENCOUNTER — APPOINTMENT (OUTPATIENT)
Dept: PHYSICAL THERAPY | Facility: CLINIC | Age: 68
DRG: 394 | End: 2018-02-25
Payer: MEDICARE

## 2018-02-25 LAB
ABO + RH BLD: NORMAL
ABO + RH BLD: NORMAL
ANION GAP SERPL CALCULATED.3IONS-SCNC: 6 MMOL/L (ref 3–14)
BLD GP AB SCN SERPL QL: NORMAL
BLOOD BANK CMNT PATIENT-IMP: NORMAL
BUN SERPL-MCNC: 11 MG/DL (ref 7–30)
CALCIUM SERPL-MCNC: 7.7 MG/DL (ref 8.5–10.1)
CHLORIDE SERPL-SCNC: 113 MMOL/L (ref 94–109)
CO2 SERPL-SCNC: 26 MMOL/L (ref 20–32)
CREAT SERPL-MCNC: 0.42 MG/DL (ref 0.52–1.04)
GFR SERPL CREATININE-BSD FRML MDRD: >90 ML/MIN/1.7M2
GLUCOSE SERPL-MCNC: 105 MG/DL (ref 70–99)
HGB BLD-MCNC: 8.2 G/DL (ref 11.7–15.7)
HGB BLD-MCNC: 8.5 G/DL (ref 11.7–15.7)
HGB BLD-MCNC: 8.8 G/DL (ref 11.7–15.7)
HGB BLD-MCNC: 9 G/DL (ref 11.7–15.7)
POTASSIUM SERPL-SCNC: 3.3 MMOL/L (ref 3.4–5.3)
POTASSIUM SERPL-SCNC: 3.4 MMOL/L (ref 3.4–5.3)
SODIUM SERPL-SCNC: 145 MMOL/L (ref 133–144)
SPECIMEN EXP DATE BLD: NORMAL

## 2018-02-25 PROCEDURE — 25000128 H RX IP 250 OP 636: Performed by: INTERNAL MEDICINE

## 2018-02-25 PROCEDURE — 40000193 ZZH STATISTIC PT WARD VISIT: Performed by: PHYSICAL THERAPIST

## 2018-02-25 PROCEDURE — A9270 NON-COVERED ITEM OR SERVICE: HCPCS | Mod: GY | Performed by: INTERNAL MEDICINE

## 2018-02-25 PROCEDURE — 86901 BLOOD TYPING SEROLOGIC RH(D): CPT | Performed by: INTERNAL MEDICINE

## 2018-02-25 PROCEDURE — 84132 ASSAY OF SERUM POTASSIUM: CPT | Performed by: INTERNAL MEDICINE

## 2018-02-25 PROCEDURE — 80048 BASIC METABOLIC PNL TOTAL CA: CPT | Performed by: INTERNAL MEDICINE

## 2018-02-25 PROCEDURE — 25000132 ZZH RX MED GY IP 250 OP 250 PS 637: Mod: GY | Performed by: INTERNAL MEDICINE

## 2018-02-25 PROCEDURE — 97110 THERAPEUTIC EXERCISES: CPT | Mod: GP | Performed by: PHYSICAL THERAPIST

## 2018-02-25 PROCEDURE — 86900 BLOOD TYPING SEROLOGIC ABO: CPT | Performed by: INTERNAL MEDICINE

## 2018-02-25 PROCEDURE — 97116 GAIT TRAINING THERAPY: CPT | Mod: GP | Performed by: PHYSICAL THERAPIST

## 2018-02-25 PROCEDURE — 99232 SBSQ HOSP IP/OBS MODERATE 35: CPT | Performed by: INTERNAL MEDICINE

## 2018-02-25 PROCEDURE — 12000000 ZZH R&B MED SURG/OB

## 2018-02-25 PROCEDURE — 85018 HEMOGLOBIN: CPT | Performed by: INTERNAL MEDICINE

## 2018-02-25 PROCEDURE — 86850 RBC ANTIBODY SCREEN: CPT | Performed by: INTERNAL MEDICINE

## 2018-02-25 RX ADMIN — LEVOTHYROXINE SODIUM 100 MCG: 100 TABLET ORAL at 08:57

## 2018-02-25 RX ADMIN — SIMVASTATIN 40 MG: 40 TABLET, FILM COATED ORAL at 21:41

## 2018-02-25 RX ADMIN — ANASTROZOLE 1 MG: 1 TABLET, COATED ORAL at 08:57

## 2018-02-25 RX ADMIN — PANTOPRAZOLE SODIUM 40 MG: 40 TABLET, DELAYED RELEASE ORAL at 06:26

## 2018-02-25 RX ADMIN — ONDANSETRON 4 MG: 2 INJECTION INTRAMUSCULAR; INTRAVENOUS at 19:45

## 2018-02-25 RX ADMIN — POTASSIUM CHLORIDE 20 MEQ: 1500 TABLET, EXTENDED RELEASE ORAL at 11:05

## 2018-02-25 RX ADMIN — SODIUM CHLORIDE: 9 INJECTION, SOLUTION INTRAVENOUS at 12:19

## 2018-02-25 RX ADMIN — PANTOPRAZOLE SODIUM 40 MG: 40 TABLET, DELAYED RELEASE ORAL at 17:05

## 2018-02-25 RX ADMIN — CITALOPRAM HYDROBROMIDE 20 MG: 20 TABLET ORAL at 08:56

## 2018-02-25 RX ADMIN — POTASSIUM CHLORIDE 40 MEQ: 1500 TABLET, EXTENDED RELEASE ORAL at 08:56

## 2018-02-25 ASSESSMENT — ACTIVITIES OF DAILY LIVING (ADL)
ADLS_ACUITY_SCORE: 11

## 2018-02-25 NOTE — PROGRESS NOTES
[facility]  Hospitalist Progress Note  Patient Name: Michelle Pedersen    MRN: [id number]  Provider: Reymundo Gomez MD  02/25/18    Initial presenting complaint/issue to hospital (Diagnosis): diarrhea and black stools         Assessment and Plan:      Summary:  Michelle Pedersen is a 67 year old female with history of hypothyroidism, hypercholesterolemia, depression, breast cancer, unremarkable colonoscopy 4 years ago, and what looks like neurofibromatosis.  She presented to the emergency department o 2/20/18 for evaluation of 2 days of diarrhea that it turned black.  Emergency department evaluation showed tachycardia but otherwise stable vital signs.  Stool was guaiac positive.  White blood cell count was elevated at 20,000.  Lactic acid was elevated at 4.2.  Hemoglobin was somewhat low at 11.3.  Urinalysis was unremarkable.  CT of abdomen and pelvis suggested some left-sided colon wall thickening.  She was admitted to the hospital with concern about possible upper GI bleed.  She developed significant anemia with hemoglobin as low as 6.1 for which she received several blood transfusions (4 units so far).  She was seen by gastroenterology and had upper endoscopy which showed no obvious source of bleeding or peptic ulcer disease.  She was being monitored and on 2/23/18 she had an episode of frankly bloody stool.  Hemoglobin had risen to 6.9 from 6.1 the evening before.  At this point, ischemic colitis was suspected given rodrigo blood, lactic acidosis on presentation, leukocytosis, and CT findings of left-sided colon wall thickening in the distal transverse colon and proximal descending colon.      Problem list:        1.  Significant GI bleed.  With the appearance of red blood in stool this morning I am more suspicious of a lower source of bleeding.  Overall, her constellation of symptoms, lab findings, and imaging findings (black stools, now bloody stools, lactic acidosis, anemia, leukocytosis, and colon wall  thickening near the splenic flexure) suggests ischemic colitis.  I discussed this with Dr. York and he would consider colonoscopy if bleeding persisted.  Nursing has page out to Dr. GUADALUPE to see if patient should be prepped for colonoscopy tomorrow- patient is reluctant but agreeable.       2.  Hypothyroidism.  Continue prior to admission Synthroid      3.  History of breast cancer.  Continue prior to admission Arimidex      4.  Depression.  Continue prior to admission Celexa      5.  Deconditioning.  Physical therapy consult appreciated.          DVT Prophylaxis: Pneumatic Compression Devices  Code Status: Full Code  Disposition: I suspect she will be here for at least 2 more days        Interval History:      Stools seem to be slowing.  Feels a little stronger.                   Physical Exam:      Last Vital Signs:  /54 (BP Location: Left arm)  Pulse 95  Temp 100.4  F (38  C) (Oral)  Resp 18  Wt 112.6 kg (248 lb 4.8 oz)  SpO2 97%  BMI 45.41 kg/m2    Intake/Output Summary (Last 24 hours) at 02/25/18 1556  Last data filed at 02/25/18 1436   Gross per 24 hour   Intake          4005.33 ml   Output             1250 ml   Net          2755.33 ml       GENERAL:  Comfortable. Cooperative.  PSYCH: pleasant, oriented, No acute distress.  EYES: PERRLA, Normal conjunctiva.  HEART:  Regular rate and rhythm. No JVD. Pulses normal. No edema.  LUNGS:  Clear to auscultation, normal Respiratory effort.  ABDOMEN:  Soft, no hepatosplenomegaly, normal bowel sounds.  EXTREMETIES: No clubbing, cyanosis or ischemia  SKIN:  Dry to touch, No rash.           Medications:      All current medications were reviewed.         Data:      All new lab and imaging data was reviewed.   Labs:       Lab Results   Component Value Date     02/25/2018     02/22/2018     02/21/2018    Lab Results   Component Value Date    CHLORIDE 113 02/25/2018    CHLORIDE 115 02/22/2018    CHLORIDE 116 02/21/2018    Lab Results   Component  Value Date    BUN 11 02/25/2018    BUN 26 02/22/2018    BUN 33 02/21/2018      Lab Results   Component Value Date    POTASSIUM 3.4 02/25/2018    POTASSIUM 3.3 02/25/2018    POTASSIUM 4.1 02/22/2018    Lab Results   Component Value Date    CO2 26 02/25/2018    CO2 26 02/22/2018    CO2 28 02/21/2018    Lab Results   Component Value Date    CR 0.42 02/25/2018    CR 0.59 02/22/2018    CR 0.72 02/21/2018          Recent Labs  Lab 02/25/18  1430 02/25/18  0607 02/25/18  0203  02/23/18  0823  02/20/18  1116   WBC  --   --   --   --   --   --  20.0*   HGB 8.2* 9.0* 8.8*  < > 6.9*  < > 11.3*   HCT  --   --   --   --   --   --  38.0   MCV  --   --   --   --   --   --  85   PLT  --   --   --   --  154  --  280   < > = values in this interval not displayed.

## 2018-02-25 NOTE — PROGRESS NOTES
She feels much better. Her hgb is 9 this am. She still has melena. This is not typical for ischemia, which is usually a one time bleed. I suspect she is oozing from the small bowel. If we decide on a small bowel capsule study, she will need a colonoscopy first. If her hgb drops overnignt, we will set up a colonoscopy while she is in the hospital.

## 2018-02-25 NOTE — PLAN OF CARE
Problem: Patient Care Overview  Goal: Plan of Care/Patient Progress Review  Discharge Planner PT   Patient plan for discharge: Home with spouse  Current status: Patient independent with bed mobility with HOB elevated; sit<>stand with SBA; gait x 80 feet with a rolling walker and SBA; no LOB; slow gait speed; no dizziness; Hgb 9.0 this am; able to tolerate seated ex's; recommend patient ambulate with nursing staff 2 additional times today; patient in agreement  Barriers to return to prior living situation: 1 step to enter; weakness  Recommendations for discharge: Home with spouse; possible Home PT if needed by discharge  Rationale for recommendations: weakness; decreased activity tolerance; impaired independence with functional mobility       Entered by: Jania Enamorado 02/25/2018 9:35 AM

## 2018-02-25 NOTE — PLAN OF CARE
Problem: Patient Care Overview  Goal: Plan of Care/Patient Progress Review  Outcome: Improving  Pt A&Ox4, VSS.  Denies pain.  Received one unit of PRBC at start of shift.  Blood was started at 2002 and completed at 2331.  Pt tolerated blood transfusion well.  Did get a slightly febrile (which she has in the past with blood tx) and was given PO tylenol and came back down.  Hgb was rechecked at 0200 and was up to 8.8.  Will recheck at 0600.  +2 LE edema, legs elevated.  Can get MENA with ambulation.  +4 bowel sounds, no bowel movement overnight.  Tolerating clear liquid diet.  Voiding clear yellow urine in bathroom.  IVF infusing to Right Picc.  Up 1 assist walker.  Plan:  Monitor Hgb Q8, type and screen if further blood tx is needed, possible colonoscopy. Will continue to monitor this patient.       02/25/18 6:23 AM  Latest Hgb came back 9.0.  Updated daughter over the phone.  Will recheck Hgb at 1400.  Will continue to monitor.

## 2018-02-25 NOTE — PLAN OF CARE
Problem: Patient Care Overview  Goal: Plan of Care/Patient Progress Review  Outcome: No Change  Pt up with one assist, gait steady today. Up to the bathroom multiple times. Two loose stools since 7 AM, black and tarry. Poor appetite continues, pt did manage to drink two boost supplemental drinks today. PICC line has good blood return. Pt had one unit of blood infused, will have one more unit infused later tonight. Ran blood somewhat slow, as pt is developing more edema. LS continue to be clear and pt denies any SOB. Pt did develop low grade fevers with last blood administration, Tmax 100. No rashes or itching noted.

## 2018-02-26 LAB
BACTERIA SPEC CULT: NO GROWTH
BACTERIA SPEC CULT: NO GROWTH
COLONOSCOPY: NORMAL
HGB BLD-MCNC: 8.2 G/DL (ref 11.7–15.7)
HGB BLD-MCNC: 8.3 G/DL (ref 11.7–15.7)
HGB BLD-MCNC: 8.7 G/DL (ref 11.7–15.7)
Lab: NORMAL
POTASSIUM SERPL-SCNC: 3.3 MMOL/L (ref 3.4–5.3)
POTASSIUM SERPL-SCNC: 3.8 MMOL/L (ref 3.4–5.3)
SPECIMEN SOURCE: NORMAL
SPECIMEN SOURCE: NORMAL

## 2018-02-26 PROCEDURE — 25000132 ZZH RX MED GY IP 250 OP 250 PS 637: Mod: GY | Performed by: INTERNAL MEDICINE

## 2018-02-26 PROCEDURE — A9270 NON-COVERED ITEM OR SERVICE: HCPCS | Mod: GY | Performed by: INTERNAL MEDICINE

## 2018-02-26 PROCEDURE — 84132 ASSAY OF SERUM POTASSIUM: CPT | Performed by: INTERNAL MEDICINE

## 2018-02-26 PROCEDURE — 85018 HEMOGLOBIN: CPT | Performed by: INTERNAL MEDICINE

## 2018-02-26 PROCEDURE — 99153 MOD SED SAME PHYS/QHP EA: CPT | Performed by: INTERNAL MEDICINE

## 2018-02-26 PROCEDURE — G0500 MOD SEDAT ENDO SERVICE >5YRS: HCPCS | Performed by: INTERNAL MEDICINE

## 2018-02-26 PROCEDURE — 12000000 ZZH R&B MED SURG/OB

## 2018-02-26 PROCEDURE — 99232 SBSQ HOSP IP/OBS MODERATE 35: CPT | Performed by: INTERNAL MEDICINE

## 2018-02-26 PROCEDURE — 0DJD8ZZ INSPECTION OF LOWER INTESTINAL TRACT, VIA NATURAL OR ARTIFICIAL OPENING ENDOSCOPIC: ICD-10-PCS | Performed by: INTERNAL MEDICINE

## 2018-02-26 PROCEDURE — 45378 DIAGNOSTIC COLONOSCOPY: CPT | Performed by: INTERNAL MEDICINE

## 2018-02-26 PROCEDURE — 25000128 H RX IP 250 OP 636: Performed by: INTERNAL MEDICINE

## 2018-02-26 RX ORDER — FLUMAZENIL 0.1 MG/ML
0.2 INJECTION, SOLUTION INTRAVENOUS
Status: ACTIVE | OUTPATIENT
Start: 2018-02-26 | End: 2018-02-27

## 2018-02-26 RX ORDER — NALOXONE HYDROCHLORIDE 0.4 MG/ML
.1-.4 INJECTION, SOLUTION INTRAMUSCULAR; INTRAVENOUS; SUBCUTANEOUS
Status: ACTIVE | OUTPATIENT
Start: 2018-02-26 | End: 2018-02-27

## 2018-02-26 RX ORDER — FENTANYL CITRATE 50 UG/ML
INJECTION, SOLUTION INTRAMUSCULAR; INTRAVENOUS PRN
Status: DISCONTINUED | OUTPATIENT
Start: 2018-02-26 | End: 2018-02-26 | Stop reason: HOSPADM

## 2018-02-26 RX ADMIN — SIMVASTATIN 40 MG: 40 TABLET, FILM COATED ORAL at 21:09

## 2018-02-26 RX ADMIN — POTASSIUM CHLORIDE 20 MEQ: 1500 TABLET, EXTENDED RELEASE ORAL at 09:16

## 2018-02-26 RX ADMIN — PANTOPRAZOLE SODIUM 40 MG: 40 TABLET, DELAYED RELEASE ORAL at 16:14

## 2018-02-26 RX ADMIN — PANTOPRAZOLE SODIUM 40 MG: 40 TABLET, DELAYED RELEASE ORAL at 06:38

## 2018-02-26 RX ADMIN — CITALOPRAM HYDROBROMIDE 20 MG: 20 TABLET ORAL at 09:16

## 2018-02-26 RX ADMIN — ACETAMINOPHEN 650 MG: 325 TABLET, FILM COATED ORAL at 22:47

## 2018-02-26 RX ADMIN — LEVOTHYROXINE SODIUM 100 MCG: 100 TABLET ORAL at 09:16

## 2018-02-26 RX ADMIN — POTASSIUM CHLORIDE 40 MEQ: 1500 TABLET, EXTENDED RELEASE ORAL at 06:46

## 2018-02-26 RX ADMIN — POLYETHYLENE GLYCOL-3350 AND ELECTROLYTES 2000 ML: 236; 6.74; 5.86; 2.97; 22.74 POWDER, FOR SOLUTION ORAL at 06:38

## 2018-02-26 RX ADMIN — ONDANSETRON 4 MG: 2 INJECTION INTRAMUSCULAR; INTRAVENOUS at 08:35

## 2018-02-26 RX ADMIN — ANASTROZOLE 1 MG: 1 TABLET, COATED ORAL at 09:16

## 2018-02-26 ASSESSMENT — ACTIVITIES OF DAILY LIVING (ADL)
ADLS_ACUITY_SCORE: 11

## 2018-02-26 NOTE — PROVIDER NOTIFICATION
Provider notified:  Pt next Q8 Hgb is due at 2200.  If she requires another blood transfusion, she'll need another type & cross. Can I get order to have that drawn for same time next Hgb is due at 2200.  Will continue to monitor this patient.

## 2018-02-26 NOTE — PROVIDER NOTIFICATION
Provider notified:  2200 Hgb was 8.5, which is up from previous 8.2.  She's currently still drinking prep for colonoscopy tomorrow.  Will continue to monitor.

## 2018-02-26 NOTE — PLAN OF CARE
Problem: Patient Care Overview  Goal: Plan of Care/Patient Progress Review  Outcome: No Change  Pt up with one assist, gait belt and walker. Ambulated x2 in hallway today. HGB 9 with morning draw, dropped to 8.2 at 1400 recheck. Plan is to do half of bowel prep (2000 ml) tonight, and the other half at 0700. Spoke with Dr. York on the phone, plan is to do colonoscopy tomorrow sometime after 1300. K+ 3.3 this morning, replaced and recheck came back at 3.4. Recheck scheduled for the morning. Pt has so far had two large stools since starting bowel prep. 2+ edema to BLE and LUE, IVF stopped today. Weight up 2 lb from yesterday to today. Pt will stick to clear liquid diet tonight if she drinks anything beyond bowel prep. No red or orange liquids. She did have an orange boost around 1430 today.

## 2018-02-26 NOTE — PLAN OF CARE
Problem: Patient Care Overview  Goal: Plan of Care/Patient Progress Review  PT - PT cx'd d/t going for colonoscopy. Will see pt for PT 2/27/18. Nursing notified.

## 2018-02-26 NOTE — PROGRESS NOTES
Care Transition Initial Assessment -   Reason For Consult: discharge planning  Met with: Patient and Family    Active Problems:    GI bleed       DATA  Lives With: spouse  Living Arrangements: house  Description of Support System: Supportive, Involved  Who is your support system?: , Children  Support Assessment: Adequate family and caregiver support, Adequate social supports. PT has spouse home full time.   Identified issues/concerns regarding health management: Pt admitted due to Gi bleed. Increased weakness            Quality Of Family Relationships: supportive  Transportation Available: car, family or friend will provide (patient normally drives)    ASSESSMENT  Cognitive Status:  awake, alert and oriented  Concerns to be addressed: PT and spouse plan dc to home. PT has DME at home that she is not using. Pt has walker, cane and crutches from a previous leg fracture. Pt has a shower chair, raised toilet seat and son installed grab bars in the shower and near the toilet.  Pt does not think she will have any needs at dc.   Spoke with pt about consideration of a life line and possible home care support. Pt is willing to take into home on life line. Pt dose think she will need any homecare .  Spouse is VERY [place] and can not hear if pt yells for help at night time.      PLAN  Following for rehab recommendations  Will provide in on life line

## 2018-02-26 NOTE — PROVIDER NOTIFICATION
Provider notified:  0600 Hgb came back 8.2  She is starting her next 2000 ml golytely bowel prep right now.  Will continue to monitor.

## 2018-02-26 NOTE — PROGRESS NOTES
[facility]  Hospitalist Progress Note  Patient Name: Michelle Pedersen    MRN: [id number]  Provider: Reymundo Gomez MD  02/26/18    Initial presenting complaint/issue to hospital (Diagnosis): diarrhea and black stools         Assessment and Plan:      Summary:  Michelle Pedersen is a 67 year old female with history of hypothyroidism, hypercholesterolemia, depression, breast cancer, unremarkable colonoscopy 4 years ago, and what looks like neurofibromatosis.  She presented to the emergency department o 2/20/18 for evaluation of 2 days of diarrhea that it turned black.  Emergency department evaluation showed tachycardia but otherwise stable vital signs.  Stool was guaiac positive.  White blood cell count was elevated at 20,000.  Lactic acid was elevated at 4.2.  Hemoglobin was somewhat low at 11.3.  Urinalysis was unremarkable.  CT of abdomen and pelvis suggested some left-sided colon wall thickening.  She was admitted to the hospital with concern about possible upper GI bleed.  She developed significant anemia with hemoglobin as low as 6.1 for which she received several blood transfusions (4 units so far).  She was seen by gastroenterology and had upper endoscopy which showed no obvious source of bleeding or peptic ulcer disease.  She was being monitored and on 2/23/18 she had an episode of frankly bloody stool.  Hemoglobin had risen to 6.9 from 6.1 the evening before.  At this point, ischemic colitis was suspected given rodrigo blood, lactic acidosis on presentation, leukocytosis, and CT findings of left-sided colon wall thickening in the distal transverse colon and proximal descending colon. She was treated conservatively and monitored.  Bleeding persisted. Ultimately, colonoscopy was performed and was unremarkable.       Problem list:        1.  Significant GI bleed.  With the appearance of red blood in stool I was more suspicious of a lower source of bleeding.  Overall, her constellation of symptoms, lab  findings, and imaging findings (black stools, now bloody stools, lactic acidosis, anemia, leukocytosis, and colon wall thickening near the splenic flexure) suggested possible ischemic colitis.  Bleeding continued, however.  Michelle has had a total of 6 units of red blood cells.  Colonoscopy was performed this morning and was normal. With GI prep overnight no blood or melana was noted.  Hemoglobin has been stable at 8-8.5.  ADAT per GI. If recurrent bleeding I will order a tagged RBC scan.       2.  Hypothyroidism.  Continue prior to admission Synthroid      3.  History of breast cancer.  Continue prior to admission Arimidex      4.  Depression.  Continue prior to admission Celexa      5.  Deconditioning.  Physical therapy consult appreciated.          DVT Prophylaxis: Pneumatic Compression Devices  Code Status: Full Code  Disposition: Uncertain, it depends on whether or not bleeding recurs.        Interval History:      Feeling tired after colonoscopy prep.  No visible bleeding today or on colonoscopy.                    Physical Exam:      Last Vital Signs:  /63 (BP Location: Left arm)  Pulse 95  Temp 98.2  F (36.8  C) (Oral)  Resp 16  Wt 112.6 kg (248 lb 4.8 oz)  SpO2 96%  BMI 45.41 kg/m2    Intake/Output Summary (Last 24 hours) at 02/26/18 1524  Last data filed at 02/26/18 1445   Gross per 24 hour   Intake             1360 ml   Output                0 ml   Net             1360 ml       GENERAL:  Comfortable. Cooperative.  PSYCH: pleasant, oriented, No acute distress.  EYES: PERRLA, Normal conjunctiva.  HEART:  Regular rate and rhythm. No JVD. Pulses normal. No edema.  LUNGS:  Clear to auscultation, normal Respiratory effort.  ABDOMEN:  Soft, no hepatosplenomegaly, normal bowel sounds.  EXTREMETIES: No clubbing, cyanosis or ischemia  SKIN:  Dry to touch, No rash.           Medications:      All current medications were reviewed.         Data:      All new lab and imaging data was reviewed.   Labs:        Lab Results   Component Value Date     02/25/2018     02/22/2018     02/21/2018    Lab Results   Component Value Date    CHLORIDE 113 02/25/2018    CHLORIDE 115 02/22/2018    CHLORIDE 116 02/21/2018    Lab Results   Component Value Date    BUN 11 02/25/2018    BUN 26 02/22/2018    BUN 33 02/21/2018      Lab Results   Component Value Date    POTASSIUM 3.8 02/26/2018    POTASSIUM 3.3 02/26/2018    POTASSIUM 3.4 02/25/2018    Lab Results   Component Value Date    CO2 26 02/25/2018    CO2 26 02/22/2018    CO2 28 02/21/2018    Lab Results   Component Value Date    CR 0.42 02/25/2018    CR 0.59 02/22/2018    CR 0.72 02/21/2018          Recent Labs  Lab 02/26/18  1425 02/26/18  0601 02/25/18  2153   HGB 8.3* 8.2* 8.5*

## 2018-02-26 NOTE — PLAN OF CARE
Problem: Patient Care Overview  Goal: Plan of Care/Patient Progress Review  Outcome: Improving  Ambulatory Status:  Pt up SBA to BSC and ambulating to BR occasionally.  Still feeling weak.  VS:  AFVSS.  On RA.  Pain:  Denies.  Using ice prn to neck and back.  Resp: LS diminished.  GI:  Occasional nausea with bowel prep this AM- improved with prn IV zofran x1; denies nausea this afternoon.  Minimal appetite and resuming regular diet after colonoscopy this afternoon- pt starting with clear liquids and doing well.  BS hypoactive.  Passing flatus.  Last BM several today with bowel prep- last BM around 1145 today- clear/ yellow.  :  Voiding without difficulty.  Skin:  Pale.  Tx:  P.O. protonix.  Labs:  K 3.3- replaced and recheck.  Hgb 8.3 at 1400 recheck (8.2 with AM draw).  Consults:  GI, PT/SW  Disposition:  Continue to monitor hgb and watch for any recurrence.  Family at bedside this afternoon and updated with POC.

## 2018-02-26 NOTE — OR NURSING
1315-Post colonoscopy report called to Jasmyn PARADA on 5th floor. Patient denies any pain at this time and resting in recovery area in Endoscopy suite.WW

## 2018-02-26 NOTE — PLAN OF CARE
Problem: Patient Care Overview  Goal: Plan of Care/Patient Progress Review  Outcome: No Change  Pt A&Ox4.  VSS.  Denies pain.  Did start bowel prep last evening, finished 2000ml of Golytely.  Bowels still dark, not running clear.  Will drink another 2000ml of golytely at 0700AM 02/26/18.  Received IV zofran once overnight while drinking prep, no emesis.  Voiding yellow urine and having dark stools in BSC at bedside.  2200 Hgb was 8.5.  Daughter Tatyana was updated, paged hospitalist to let them know the results.  Up 1 assist and walker.  Otherwise S/P to BSC while doing bowel prep.  Plan:  2000ml Golytely at 0700 for Colonoscopy today.  Hgb recheck at 0600.  Gastroenterology and SW following.  Will continue to monitor this patient.      02/26/18 6:39 AM  Provider notified:  0600 Hgb came back 8.2  She is starting her next 2000 ml golytely bowel prep right now.  Will continue to monitor.    02/26/18 6:48 AM  K+ was 3.3, was given 40meq PO potassium per protocol.  Will administer another 20meq due at 0846.  Will continue to monitor.

## 2018-02-27 LAB
ANION GAP SERPL CALCULATED.3IONS-SCNC: 5 MMOL/L (ref 3–14)
BUN SERPL-MCNC: 4 MG/DL (ref 7–30)
CALCIUM SERPL-MCNC: 8.2 MG/DL (ref 8.5–10.1)
CHLORIDE SERPL-SCNC: 106 MMOL/L (ref 94–109)
CO2 SERPL-SCNC: 29 MMOL/L (ref 20–32)
CREAT SERPL-MCNC: 0.54 MG/DL (ref 0.52–1.04)
GFR SERPL CREATININE-BSD FRML MDRD: >90 ML/MIN/1.7M2
GLUCOSE SERPL-MCNC: 95 MG/DL (ref 70–99)
HGB BLD-MCNC: 7.3 G/DL (ref 11.7–15.7)
HGB BLD-MCNC: 8.5 G/DL (ref 11.7–15.7)
HGB BLD-MCNC: 8.7 G/DL (ref 11.7–15.7)
POTASSIUM SERPL-SCNC: 3.7 MMOL/L (ref 3.4–5.3)
SODIUM SERPL-SCNC: 140 MMOL/L (ref 133–144)

## 2018-02-27 PROCEDURE — 25000132 ZZH RX MED GY IP 250 OP 250 PS 637: Mod: GY | Performed by: INTERNAL MEDICINE

## 2018-02-27 PROCEDURE — 80048 BASIC METABOLIC PNL TOTAL CA: CPT | Performed by: INTERNAL MEDICINE

## 2018-02-27 PROCEDURE — 12000000 ZZH R&B MED SURG/OB

## 2018-02-27 PROCEDURE — A9270 NON-COVERED ITEM OR SERVICE: HCPCS | Mod: GY | Performed by: INTERNAL MEDICINE

## 2018-02-27 PROCEDURE — 99232 SBSQ HOSP IP/OBS MODERATE 35: CPT | Performed by: INTERNAL MEDICINE

## 2018-02-27 PROCEDURE — 25000132 ZZH RX MED GY IP 250 OP 250 PS 637: Mod: GY | Performed by: HOSPITALIST

## 2018-02-27 PROCEDURE — 85018 HEMOGLOBIN: CPT | Performed by: INTERNAL MEDICINE

## 2018-02-27 PROCEDURE — A9270 NON-COVERED ITEM OR SERVICE: HCPCS | Mod: GY | Performed by: HOSPITALIST

## 2018-02-27 RX ADMIN — CITALOPRAM HYDROBROMIDE 20 MG: 20 TABLET ORAL at 08:13

## 2018-02-27 RX ADMIN — MENTHOL 1 PATCH: 205.5 PATCH TOPICAL at 21:14

## 2018-02-27 RX ADMIN — ANASTROZOLE 1 MG: 1 TABLET, COATED ORAL at 08:13

## 2018-02-27 RX ADMIN — ACETAMINOPHEN 650 MG: 325 TABLET, FILM COATED ORAL at 07:42

## 2018-02-27 RX ADMIN — ACETAMINOPHEN 650 MG: 325 TABLET, FILM COATED ORAL at 15:14

## 2018-02-27 RX ADMIN — LEVOTHYROXINE SODIUM 100 MCG: 100 TABLET ORAL at 08:13

## 2018-02-27 RX ADMIN — PANTOPRAZOLE SODIUM 40 MG: 40 TABLET, DELAYED RELEASE ORAL at 15:14

## 2018-02-27 RX ADMIN — ACETAMINOPHEN 650 MG: 325 TABLET, FILM COATED ORAL at 21:18

## 2018-02-27 RX ADMIN — PANTOPRAZOLE SODIUM 40 MG: 40 TABLET, DELAYED RELEASE ORAL at 06:06

## 2018-02-27 RX ADMIN — SIMVASTATIN 40 MG: 40 TABLET, FILM COATED ORAL at 21:14

## 2018-02-27 ASSESSMENT — PAIN DESCRIPTION - DESCRIPTORS
DESCRIPTORS: ACHING
DESCRIPTORS: ACHING

## 2018-02-27 ASSESSMENT — ACTIVITIES OF DAILY LIVING (ADL)
ADLS_ACUITY_SCORE: 11

## 2018-02-27 NOTE — PLAN OF CARE
Problem: Patient Care Overview  Goal: Plan of Care/Patient Progress Review  PT=-attempted to see was a little tired from a walk with RN staff and shower. Demo HEP Ind. Will try to see later today as able if not will see tomorrow. Will check Hgb results prior to session.

## 2018-02-27 NOTE — PROGRESS NOTES
[facility]  Hospitalist Progress Note  Patient Name: Michelle Pedersen    MRN: [id number]  Provider: Reymundo Gomez MD  02/27/18    Initial presenting complaint/issue to hospital (Diagnosis): diarrhea and black stools         Assessment and Plan:      Summary:  Michelle Pedersen is a 67 year old female with history of hypothyroidism, hypercholesterolemia, depression, breast cancer, unremarkable colonoscopy 4 years ago, and what looks like neurofibromatosis.  She presented to the emergency department o 2/20/18 for evaluation of 2 days of diarrhea that it turned black.  Emergency department evaluation showed tachycardia but otherwise stable vital signs.  Stool was guaiac positive.  White blood cell count was elevated at 20,000.  Lactic acid was elevated at 4.2.  Hemoglobin was somewhat low at 11.3.  Urinalysis was unremarkable.  CT of abdomen and pelvis suggested some left-sided colon wall thickening.  She was admitted to the hospital with concern about possible upper GI bleed.  She developed significant anemia with hemoglobin as low as 6.1 for which she received several blood transfusions (4 units so far).  She was seen by gastroenterology and had upper endoscopy which showed no obvious source of bleeding or peptic ulcer disease.  She was being monitored and on 2/23/18 she had an episode of frankly bloody stool.  Hemoglobin had risen to 6.9 from 6.1 the evening before.  At this point, ischemic colitis was suspected given rodrigo blood, lactic acidosis on presentation, leukocytosis, and CT findings of left-sided colon wall thickening in the distal transverse colon and proximal descending colon. She was treated conservatively and monitored.  Bleeding persisted. Ultimately, colonoscopy was performed and was unremarkable.       Problem list:        1.  Significant GI bleed.  With the appearance of red blood in stool I was more suspicious of a lower source of bleeding.  Overall, her constellation of symptoms, lab  findings, and imaging findings (black stools, now bloody stools, lactic acidosis, anemia, leukocytosis, and colon wall thickening near the splenic flexure) suggested possible ischemic colitis.  Bleeding continued, however.  Michelle has had a total of 6 units of red blood cells.  Colonoscopy was performed on 2/26 and was normal suggesting possible ischemic colitis that had healed or mid bowel source.  No stool for > 24 hours but HGB has drifted down a bit.  Feeling better.  Regular diet as tolerated. Continue to monitor HGB. If bleeding recurs, consider tagged RBC scan. If no recurrence of bleeding consider outpatient cameral capsule study- not sure how Dr. York coordinates that.       2.  Hypothyroidism.  Continue prior to admission Synthroid      3.  History of breast cancer.  Continue prior to admission Arimidex      4.  Depression.  Continue prior to admission Celexa      5.  Deconditioning.  Physical therapy consult appreciated.          DVT Prophylaxis: Pneumatic Compression Devices  Code Status: Full Code  Disposition: Uncertain, it depends on whether or not bleeding recurs.        Interval History:      No stool for > 24 hours.  Feeling better. Not much appetite yet.                   Physical Exam:      Last Vital Signs:  /55 (BP Location: Left arm)  Pulse 100  Temp 98.2  F (36.8  C) (Oral)  Resp 20  Wt 111.2 kg (245 lb 1.6 oz)  SpO2 94%  BMI 44.83 kg/m2    Intake/Output Summary (Last 24 hours) at 02/27/18 1313  Last data filed at 02/27/18 0817   Gross per 24 hour   Intake              640 ml   Output                0 ml   Net              640 ml       GENERAL:  Comfortable. Cooperative.  PSYCH: pleasant, oriented, No acute distress.  EYES: PERRLA, Normal conjunctiva.  HEART:  Regular rate and rhythm. No JVD. Pulses normal. No edema.  LUNGS:  Clear to auscultation, normal Respiratory effort.  ABDOMEN:  Soft, no hepatosplenomegaly, normal bowel sounds.  EXTREMETIES: No clubbing, cyanosis or  ischemia  SKIN:  Dry to touch, No rash.           Medications:      All current medications were reviewed.         Data:      All new lab and imaging data was reviewed.   Labs:  No results for input(s): CULT in the last 168 hours.       Lab Results   Component Value Date     02/27/2018     02/25/2018     02/22/2018    Lab Results   Component Value Date    CHLORIDE 106 02/27/2018    CHLORIDE 113 02/25/2018    CHLORIDE 115 02/22/2018    Lab Results   Component Value Date    BUN 4 02/27/2018    BUN 11 02/25/2018    BUN 26 02/22/2018      Lab Results   Component Value Date    POTASSIUM 3.7 02/27/2018    POTASSIUM 3.8 02/26/2018    POTASSIUM 3.3 02/26/2018    Lab Results   Component Value Date    CO2 29 02/27/2018    CO2 26 02/25/2018    CO2 26 02/22/2018    Lab Results   Component Value Date    CR 0.54 02/27/2018    CR 0.42 02/25/2018    CR 0.59 02/22/2018          Recent Labs  Lab 02/27/18  0604 02/26/18  1810 02/26/18  1425   HGB 7.3* 8.7* 8.3*

## 2018-02-27 NOTE — PROGRESS NOTES
"SPIRITUAL HEALTH SERVICES  SPIRITUAL ASSESSMENT Progress Note  [facility]    PRIMARY FOCUS:     Goals of care    Emotional/spiritual/Yazidi distress    Support for coping    ILLNESS CIRCUMSTANCES:   Reviewed documentation. Reflective conversation shared with pt Michelle and her  Corey which integrated elements of illness and family narratives.     Context of Serious Illness/Symptom(s) - Michelle reported that she has a GI bleed but the source has yet to be discovered.  She has had seven units of blood but her hemoglobin remains low.    Persons/Resources Involved - Michelle named Corey, their two daughters, and a son all of whom live in the area.    DISTRESS:     Emotional/Existential/Relational Distress - none directly expressed, but she hopes to have some answers soon.    Spiritual/Pentecostalism Distress - none named: \"We have a strong cristy in God.\"    Social/Cultural/Economic Distress - none identified.  Michelle and Corey are retired.    SPIRIT (Coping):     [employer]/[facility] - Pentecostalism; not affiliated with a cristy community at this time.    Spiritual Practice(s) - Michelle has been involved in [facility] [employer] program for two years.  She welcomed prayer.    Emotional/Existential/Relational Connections - Michelle and Corey have five grandchildren.    SENSE-MAKING:    Goals of Care - Michelle reported that she will undergo two more procedures that will hopefully reveal the source of the bleed.  Informed her and Corey how to request another  visit per their needs.    Meaning/Sense-Making - Michelle pleasantly responded to my inquiries but did not reflect further on her illness or other aspects of her life.    PLAN: No further plans; I and other chaplains remain available per pt/family request.    Derian Sullivan M.Div., [facility]  Staff   Pager [phone]  "

## 2018-02-27 NOTE — PLAN OF CARE
Problem: Patient Care Overview  Goal: Plan of Care/Patient Progress Review  Outcome: No Change  Pt remains admitted for GI bleed  Pt reported some back pain during shift, tylenol given x1  Hemo checked at 6p, 8.7  No BM during shift  Edema present at +2 in lower extremities  PICC in place, red lumen patent, purple lumen unable to flush  Up SBA with walker in hallway x1, tolerated well  Regular diet, tolerated well  Will continue to monitor

## 2018-02-27 NOTE — PROGRESS NOTES
Her hgb dropped this AM to 7.3. She has had no bm today and is on a regular diet. Her abdomen is soft with no masses. We set up capsule at [facility] as an outpatient and it usually takes a few weeks to get it done.

## 2018-02-27 NOTE — PROGRESS NOTES
CLINICAL NUTRITION SERVICES - REASSESSMENT NOTE      Recommendations Ordered by Registered Dietitian (RD):   1. Discontinue Ensure clear BID per pt request.  2. Add Magic Cup orange with meal trays.      Malnutrition: Patient does not meet two of the above criteria necessary for diagnosing malnutrition but is at risk for malnutrition with source of GI bleeding unidentified and potential for additional time spent NPO.        EVALUATION OF PROGRESS TOWARD GOALS   Diet: Regular  Nutrition Supplements: Ensure Clear BID between meals  Intake/Tolerance: Has been consuming her clear supplements and 0-75% of meals (0 x 1 meal). She has been ordering meals BID since advancement and reports that her appetite is improving and almost at baseline. She ate soup and crackers last night. Bagel, 1/2 grapes, 1/2 cantaloupe and 1/2 glass of regular apple juice (she stated she was still working on the apple juice but that there were too many grapes). She reports that this sie breakfast is typical for her. She reports no n/v but positive for diarrhea.     Dosing Weight: 65 kg - adjusted from lowest likely rehydrated weight     ASSESSED NUTRITION NEEDS - assessed 2/24  Estimated Energy Needs: 6922-6026 kcals (25-30 kcal/kg)  Justification: obese  Estimated Protein Needs:  grams protein (1.5-2.0 g pro/kg)  Justification: preservation of lean body mass (1.0 g/kg actual body weight)  Estimated Fluid Needs: 3187-5169 mL (1 mL/kcal)  Justification: maintenance and per provider pending fluid status    NEW FINDINGS  - Advanced from clears 2/25 and full liquids 2/26  - Colonoscopy 2/26 was normal  - Hemoglobin had remained stable, dropped this morning.     Previous Goals:   Diet order advancement past clears w/in 48-72 hrs  Evaluation: Met    Previous Nutrition Diagnosis:   Inadequate oral intake related to restricted diet order 2/2 GI bleed, nausea, oral intake hesitency as evidenced by NPO/clears meeting <50% needs x 4 days since  admission.   Evaluation: Improving    MALNUTRITION  % Weight Loss: None noted  % Intake: Decreased intake does not meet criteria for malnutrition (advanced from clears 2/25, now regular diet with pt reported intake is at baseline)  Subcutaneous Fat Loss: None observed  Muscle Loss: None observed but difficult to assess in an obese pt  Fluid Retention: Mild 2+ both ankles, both feet, left wrist and hand    Malnutrition Diagnosis: Patient does not meet two of the above criteria necessary for diagnosing malnutrition but is at risk for malnutrition with source of GI bleeding unidentified and potential for additional time spent NPO.      CURRENT NUTRITION DIAGNOSIS  Inadequate oral intake related to need for bowel prep for colonoscopy and slow diet advancement as evidenced by pt meeting <75% of needs since advancement and dependence on oral nutrition supplements.     INTERVENTIONS  Recommendations / Nutrition Prescription  1. Diet per MD  2. Discontinue Ensure clear BID per pt request.  3. Add Magic Cup orange with meal trays.       Implementation  Nutrition Education: Explained the role of supplements in meeting protein needs.  Medical Food Supplement: Modified supplements per pt preferences.   Collaboration and Referral of Nutrition Care: Discussed pt during morning interdisciplinary rounds.     Goals  Pt to consume 50-75% of meals at least BID + 1-2 supplements.    MONITORING AND EVALUATION  Progress towards goals will be monitored and evaluated per protocol and Practice Guidelines      Cookie Patrick   Dietetic Intern

## 2018-02-27 NOTE — PLAN OF CARE
Problem: Gastrointestinal Bleeding (Adult)  Goal: Signs and Symptoms of Listed Potential Problems Will be Absent, Minimized or Managed (Gastrointestinal Bleeding)  Signs and symptoms of listed potential problems will be absent, minimized or managed by discharge/transition of care (reference Gastrointestinal Bleeding (Adult) CPG).   Ao, vss. Lower back pain managed by prn medication. Up sba

## 2018-02-27 NOTE — PLAN OF CARE
Problem: Patient Care Overview  Goal: Plan of Care/Patient Progress Review  Outcome: Improving  Patient was admitted for GI bleed. No loose stools noted during this shift. Tylenol given x1 for headache. Up walking SBA and walker. On regular diet. Picc was unable to get blood return. Hemoglobin was 7.3 and recheck of 8.5 at noon. Pt calls well. Continue plan of care.

## 2018-02-27 NOTE — PLAN OF CARE
Problem: Patient Care Overview  Goal: Plan of Care/Patient Progress Review  Vitals stable. No BM overnight. Tylenol given for low back pain. Hgb monitored every 12 hours, Hgb 7.3 this am down from 8.7.

## 2018-02-28 ENCOUNTER — APPOINTMENT (OUTPATIENT)
Dept: PHYSICAL THERAPY | Facility: CLINIC | Age: 68
DRG: 394 | End: 2018-02-28
Payer: MEDICARE

## 2018-02-28 VITALS
TEMPERATURE: 98.6 F | DIASTOLIC BLOOD PRESSURE: 53 MMHG | RESPIRATION RATE: 20 BRPM | HEART RATE: 100 BPM | SYSTOLIC BLOOD PRESSURE: 116 MMHG | OXYGEN SATURATION: 94 % | BODY MASS INDEX: 44.83 KG/M2 | WEIGHT: 245.1 LBS

## 2018-02-28 LAB — HGB BLD-MCNC: 8.8 G/DL (ref 11.7–15.7)

## 2018-02-28 PROCEDURE — 97110 THERAPEUTIC EXERCISES: CPT | Mod: GP | Performed by: PHYSICAL THERAPIST

## 2018-02-28 PROCEDURE — 97530 THERAPEUTIC ACTIVITIES: CPT | Mod: GP | Performed by: PHYSICAL THERAPIST

## 2018-02-28 PROCEDURE — A9270 NON-COVERED ITEM OR SERVICE: HCPCS | Mod: GY | Performed by: INTERNAL MEDICINE

## 2018-02-28 PROCEDURE — 85018 HEMOGLOBIN: CPT | Performed by: INTERNAL MEDICINE

## 2018-02-28 PROCEDURE — 25000132 ZZH RX MED GY IP 250 OP 250 PS 637: Mod: GY | Performed by: INTERNAL MEDICINE

## 2018-02-28 PROCEDURE — 40000193 ZZH STATISTIC PT WARD VISIT: Performed by: PHYSICAL THERAPIST

## 2018-02-28 PROCEDURE — 99239 HOSP IP/OBS DSCHRG MGMT >30: CPT | Performed by: INTERNAL MEDICINE

## 2018-02-28 RX ORDER — PANTOPRAZOLE SODIUM 40 MG/1
40 TABLET, DELAYED RELEASE ORAL DAILY
Qty: 30 TABLET | Refills: 0 | Status: SHIPPED | OUTPATIENT
Start: 2018-02-28

## 2018-02-28 RX ADMIN — ANASTROZOLE 1 MG: 1 TABLET, COATED ORAL at 08:12

## 2018-02-28 RX ADMIN — ACETAMINOPHEN 650 MG: 325 TABLET, FILM COATED ORAL at 06:52

## 2018-02-28 RX ADMIN — CITALOPRAM HYDROBROMIDE 20 MG: 20 TABLET ORAL at 08:13

## 2018-02-28 RX ADMIN — LEVOTHYROXINE SODIUM 100 MCG: 100 TABLET ORAL at 08:12

## 2018-02-28 RX ADMIN — PANTOPRAZOLE SODIUM 40 MG: 40 TABLET, DELAYED RELEASE ORAL at 06:52

## 2018-02-28 ASSESSMENT — ACTIVITIES OF DAILY LIVING (ADL)
ADLS_ACUITY_SCORE: 11

## 2018-02-28 NOTE — PLAN OF CARE
Problem: Patient Care Overview  Goal: Plan of Care/Patient Progress Review  Physical Therapy Discharge Summary    Reason for therapy discharge:    Discharged to home.    Progress towards therapy goal(s). See goals on Care Plan in [facility] electronic health record for goal details.  Goals met  Pt did not demonstrate amb up 1 step; however, I don't anticipate pt will having any difficulty based on observation of current mobility.    Therapy recommendation(s):    Continue home exercise program.  Standing LE exercises and low back stretches for sciatic pain.

## 2018-02-28 NOTE — PLAN OF CARE
Problem: Patient Care Overview  Goal: Plan of Care/Patient Progress Review  Vitals stable, on room air. Low back pain controlled with tylenol and lidocaine patches. Pt had small soft BM this am, light brown in color. Hgb 8.8 this am, stable. Possible discharge today.

## 2018-02-28 NOTE — PLAN OF CARE
Problem: Patient Care Overview  Goal: Plan of Care/Patient Progress Review  Outcome: No Change  Pt remains admitted for GI bleed  Pt reported no BM during shift  Hemo checked 6pm, 8.7  Icy hot patches applied to lower back   Tylenol given x1 for pain, relief noted  PICC in place, patent, blood return noted  Up SBA with walker  Regular diet, tolerating well  Will continue to monitor

## 2018-02-28 NOTE — PROGRESS NOTES
She says she is going home. Her hgb is stable and her stool is brown. I told her to watch for melena. Will avoid asa and nsaids at home.

## 2018-02-28 NOTE — PROGRESS NOTES
Patient discharged to home with . PIV and PICC out, site C/D/I. Belongings and discharge instructions with patient at time of discharge. Instructions given, questions answered.

## 2018-02-28 NOTE — PLAN OF CARE
Problem: Patient Care Overview  Goal: Plan of Care/Patient Progress Review  Discharge Planner PT   Patient plan for discharge: Home with   Current status: I sit <> stand transfers, amb independently in the room and hardy without AD for atleast 50'.  Pt educated on standing LE HEP for strengthening.  Pt also educated on spinal extension exercises and LB stretches as she reports she is having increased B sciatic pain after lying in hospital bed. Pt given handouts of all exercises and body mechanics and postural principles.  Barriers to return to prior living situation: none  Recommendations for discharge: Continue with issued HEP for LE strengthening and low back stretches.  If sciatic pain does not resolve after being at home for a few weeks then patient may benefit from OP PT.  Rationale for recommendations:  Pt independent with all mobility       Entered by: Mari Tovar 02/28/2018 1:32 PM

## 2018-02-28 NOTE — DISCHARGE SUMMARY
Discharge Summary  Hospitalist Service    Michelle Pedersen MRN# [id number]   YOB: 1950 Age: 67 year old     Date of Admission:  2/20/2018  Date of Discharge:  2/28/2018  Admitting Physician:  No admitting provider for patient encounter.  Discharge Physician: Sam Palomares DO  Discharging Service: Hospitalist Service     Primary Provider: Vidya Serna  Primary Care Physician Phone Number: [phone]         Discharge Diagnoses/Problem Oriented Hospital Course (Providers):    Michelle Pedersen was admitted on 2/20/2018 by No admitting provider for patient encounter. and I would refer you to their history and physical.  The following problems were addressed during her hospitalization:    1.  GI bleed.  Due to suspected ischemic colitis.  Hemoglobin now stable.  She has not had any further bleeding today.  Allowed to discharge home.  Follow-up with gastroenterology in the outpatient setting to consider pill endoscopy.  Continue Protonix for now.    2.  Acute blood loss anemia.  Due to GI bleed from suspected ischemic colitis.  Hemoglobin stable at 8.8 today.  Recheck CBC in 1 week as an outpatient.    3.  Hypothyroidism.  Synthroid.    4.  Previous breast cancer.  Continue Arimidex.    5.  Depression.  Continue Celexa.    6.  Hyperlipidemia.  Simvastatin.         Code Status:      Full Code        Brief Hospital Stay Summary Sent Home With Patient in AVS:           # Discharge Pain Plan:   - Patient currently has NO PAIN and is not being prescribed pain medications on discharge.           Pending Results:        Unresulted Labs Ordered in the Past 30 Days of this Admission     No orders found from 12/22/2017 to 2/21/2018.            Discharge Instructions and Follow-Up:      Follow-up Appointments     Follow-up and recommended labs and tests        Follow up with primary care provider, Vidya Serna, within 7 days for   hospital follow- up.  The following labs/tests are recommended: CBC and   BMP in 7  days.                      Discharge Disposition:      Discharged to home         Discharge Medications:        Current Discharge Medication List      START taking these medications    Details   pantoprazole (PROTONIX) 40 MG EC tablet Take 1 tablet (40 mg) by mouth daily  Qty: 30 tablet, Refills: 0    Associated Diagnoses: Gastrointestinal hemorrhage, unspecified gastrointestinal hemorrhage type         CONTINUE these medications which have NOT CHANGED    Details   Cholecalciferol (VITAMIN D-3) 1000 UNITS CAPS Take 2,000 Units by mouth daily      anastrozole (ARIMIDEX) 1 MG tablet Take 1 mg by mouth daily      Glucosamine-Chondroitin 250-200 MG TABS Take 1 tablet by mouth daily      melatonin 5 MG tablet Take 5 mg by mouth nightly as needed for sleep      fish oil-omega-3 fatty acids 1000 MG capsule Take 1 g by mouth daily      calcium-vitamin D (CALTRATE) 600-400 MG-UNIT per tablet Take 1 tablet by mouth daily      levothyroxine (SSYNTHROID,LEVOTHROID) 100 MCG tablet Take 100 mcg by mouth daily.      simvastatin (ZOCOR) 20 MG tablet Take 40 mg by mouth At Bedtime       citalopram (CELEXA) 20 MG tablet Take 20 mg by mouth daily.               Allergies:         Allergies   Allergen Reactions     Penicillins Itching and Rash     Tolerated ceftriaxone             Condition and Physical on Discharge:      Discharge condition: Stable   Vitals: Blood pressure 116/53, pulse 100, temperature 98.6  F (37  C), temperature source Oral, resp. rate 20, weight 111.2 kg (245 lb 1.6 oz), SpO2 94 %.   Gen:  NAD, A&Ox3.  Eyes:  PERRL, sclera anicteric.  OP:  MMM, no lesions.  Neck:  Supple.  CV:  Regular, no murmurs.  Lung:  CTA b/l, normal effort.  Ab:  +BS, soft.  Skin:  Warm, dry to touch.  No rash.  Ext:  No pitting edema LE b/l.        Discharge Time:      I spent 35 minutes with Ms. Pedersen and working on discharge on 2/28/18.        Image Results From This Hospital Stay (For Non-EPIC Providers):        Results for orders  placed or performed during the hospital encounter of 02/20/18   CT Abdomen Pelvis w Contrast    Narrative    CT ABDOMEN/PELVIS WITH CONTRAST February 20, 2018 2:28 PM     HISTORY: GI bleed, elevated WBC.    TECHNIQUE: Volumetric acquisition through abdomen and pelvis with  IV  contrast.  100mL Isovue-370. Radiation dose for this scan was reduced  using automated exposure control, adjustment of the mA and/or kV  according to patient size, or iterative reconstruction technique.    COMPARISON: 10/26/2011.    FINDINGS: Coronary artery calcifications. The liver, gallbladder,  spleen, pancreas, adrenal glands and kidneys demonstrate no worrisome  findings. There are a few small and borderline prominent upper  abdominal lymph nodes and a left retroperitoneal lymph node near the  adrenal gland which are stable. Mild vascular calcification. No  abdominal aortic aneurysm.    Mild wall thickening of the transverse and upper descending colon  suggesting colitis. There was similar mild wall thickening involving  the colon on the previous exam. No pneumatosis, bowel obstruction or  free air.    Uterus is present. No suspicious pelvic masses. Yañez catheter in the  bladder.      Impression    IMPRESSION: Mild nonspecific left-sided colitis.       AKASH HERNANDEZ MD           Most Recent Lab Results In EPIC (For Non-EPIC Providers):    Most Recent 3 CBC's:  Recent Labs   Lab Test  02/28/18   0551  02/27/18   1800  02/27/18   1145   02/23/18   0823   02/20/18   1116   10/28/11   0650   10/27/11   0345   WBC   --    --    --    --    --    --   20.0*   --   12.8*   --   14.2*   HGB  8.8*  8.7*  8.5*   < >  6.9*   < >  11.3*   < >  8.5*   < >  9.4*   MCV   --    --    --    --    --    --   85   --   91   --   90   PLT   --    --    --    --   154   --   280   --   168   --   242    < > = values in this interval not displayed.      Most Recent 3 BMP's:  Recent Labs   Lab Test  02/27/18   0604  02/26/18   1425  02/26/18   0601    02/25/18   0607  02/22/18   0558   NA  140   --    --    --   145*  145*   POTASSIUM  3.7  3.8  3.3*   < >  3.3*  4.1   CHLORIDE  106   --    --    --   113*  115*   CO2  29   --    --    --   26  26   BUN  4*   --    --    --   11  26   CR  0.54   --    --    --   0.42*  0.59   ANIONGAP  5   --    --    --   6  4   VIRI  8.2*   --    --    --   7.7*  7.5*   GLC  95   --    --    --   105*  102*    < > = values in this interval not displayed.     Most Recent 3 Troponin's:No lab results found.  Most Recent 3 INR's:  Recent Labs   Lab Test  02/23/18   0823  10/26/11   1750   INR  1.25*  1.11     Most Recent 2 LFT's:  Recent Labs   Lab Test  02/20/18   1116  10/26/11   1750   AST  13  17   ALT  19  25   ALKPHOS  64  68   BILITOTAL  0.4  0.4     Most Recent Cholesterol Panel:No lab results found.  Most Recent 6 Bacteria Isolates From Any Culture (See EPIC Reports for Culture Details):  Recent Labs   Lab Test  02/20/18   1211  02/20/18   1155   CULT  No growth  No growth     Most Recent TSH, T4 and HgbA1c: No lab results found.

## 2018-12-05 ENCOUNTER — APPOINTMENT (OUTPATIENT)
Dept: CT IMAGING | Facility: CLINIC | Age: 68
End: 2018-12-05
Attending: NURSE PRACTITIONER
Payer: MEDICARE

## 2018-12-05 ENCOUNTER — HOSPITAL ENCOUNTER (EMERGENCY)
Facility: CLINIC | Age: 68
Discharge: HOME | End: 2018-12-05
Attending: NURSE PRACTITIONER | Admitting: NURSE PRACTITIONER
Payer: MEDICARE

## 2018-12-05 VITALS
OXYGEN SATURATION: 95 % | HEART RATE: 115 BPM | RESPIRATION RATE: 16 BRPM | DIASTOLIC BLOOD PRESSURE: 79 MMHG | BODY MASS INDEX: 40.24 KG/M2 | WEIGHT: 220 LBS | TEMPERATURE: 99.1 F | SYSTOLIC BLOOD PRESSURE: 145 MMHG

## 2018-12-05 DIAGNOSIS — R11.2 NAUSEA VOMITING AND DIARRHEA: ICD-10-CM

## 2018-12-05 DIAGNOSIS — R19.7 NAUSEA VOMITING AND DIARRHEA: ICD-10-CM

## 2018-12-05 LAB
ALBUMIN SERPL-MCNC: 3.3 G/DL (ref 3.4–5)
ALP SERPL-CCNC: 85 U/L (ref 40–150)
ALT SERPL W P-5'-P-CCNC: 17 U/L (ref 0–50)
ANION GAP SERPL CALCULATED.3IONS-SCNC: 6 MMOL/L (ref 3–14)
AST SERPL W P-5'-P-CCNC: 13 U/L (ref 0–45)
BASOPHILS # BLD AUTO: 0 10E9/L (ref 0–0.2)
BASOPHILS NFR BLD AUTO: 0.1 %
BILIRUB SERPL-MCNC: 0.4 MG/DL (ref 0.2–1.3)
BUN SERPL-MCNC: 14 MG/DL (ref 7–30)
CALCIUM SERPL-MCNC: 8.7 MG/DL (ref 8.5–10.1)
CHLORIDE SERPL-SCNC: 107 MMOL/L (ref 94–109)
CO2 SERPL-SCNC: 27 MMOL/L (ref 20–32)
CREAT BLD-MCNC: 0.7 MG/DL (ref 0.52–1.04)
CREAT SERPL-MCNC: 0.66 MG/DL (ref 0.52–1.04)
DIFFERENTIAL METHOD BLD: ABNORMAL
EOSINOPHIL # BLD AUTO: 0 10E9/L (ref 0–0.7)
EOSINOPHIL NFR BLD AUTO: 0.2 %
ERYTHROCYTE [DISTWIDTH] IN BLOOD BY AUTOMATED COUNT: 14.1 % (ref 10–15)
GFR SERPL CREATININE-BSD FRML MDRD: 83 ML/MIN/1.7M2
GFR SERPL CREATININE-BSD FRML MDRD: 89 ML/MIN/1.7M2
GLUCOSE SERPL-MCNC: 119 MG/DL (ref 70–99)
HCT VFR BLD AUTO: 46.7 % (ref 35–47)
HGB BLD-MCNC: 14.6 G/DL (ref 11.7–15.7)
IMM GRANULOCYTES # BLD: 0.1 10E9/L (ref 0–0.4)
IMM GRANULOCYTES NFR BLD: 0.6 %
LIPASE SERPL-CCNC: 89 U/L (ref 73–393)
LYMPHOCYTES # BLD AUTO: 0.3 10E9/L (ref 0.8–5.3)
LYMPHOCYTES NFR BLD AUTO: 3.6 %
MCH RBC QN AUTO: 29.5 PG (ref 26.5–33)
MCHC RBC AUTO-ENTMCNC: 31.3 G/DL (ref 31.5–36.5)
MCV RBC AUTO: 94 FL (ref 78–100)
MONOCYTES # BLD AUTO: 0.5 10E9/L (ref 0–1.3)
MONOCYTES NFR BLD AUTO: 5.4 %
NEUTROPHILS # BLD AUTO: 7.8 10E9/L (ref 1.6–8.3)
NEUTROPHILS NFR BLD AUTO: 90.1 %
NRBC # BLD AUTO: 0 10*3/UL
NRBC BLD AUTO-RTO: 0 /100
PLATELET # BLD AUTO: 191 10E9/L (ref 150–450)
POTASSIUM SERPL-SCNC: 4.3 MMOL/L (ref 3.4–5.3)
PROT SERPL-MCNC: 7.2 G/DL (ref 6.8–8.8)
RBC # BLD AUTO: 4.95 10E12/L (ref 3.8–5.2)
SODIUM SERPL-SCNC: 140 MMOL/L (ref 133–144)
WBC # BLD AUTO: 8.7 10E9/L (ref 4–11)

## 2018-12-05 PROCEDURE — 96374 THER/PROPH/DIAG INJ IV PUSH: CPT | Mod: 59

## 2018-12-05 PROCEDURE — 25000128 H RX IP 250 OP 636: Performed by: NURSE PRACTITIONER

## 2018-12-05 PROCEDURE — 99285 EMERGENCY DEPT VISIT HI MDM: CPT | Mod: 25

## 2018-12-05 PROCEDURE — 80053 COMPREHEN METABOLIC PANEL: CPT | Performed by: NURSE PRACTITIONER

## 2018-12-05 PROCEDURE — 25000132 ZZH RX MED GY IP 250 OP 250 PS 637: Mod: GY | Performed by: NURSE PRACTITIONER

## 2018-12-05 PROCEDURE — 74177 CT ABD & PELVIS W/CONTRAST: CPT

## 2018-12-05 PROCEDURE — 82565 ASSAY OF CREATININE: CPT | Mod: 91

## 2018-12-05 PROCEDURE — 85025 COMPLETE CBC W/AUTO DIFF WBC: CPT | Performed by: NURSE PRACTITIONER

## 2018-12-05 PROCEDURE — 83690 ASSAY OF LIPASE: CPT | Performed by: NURSE PRACTITIONER

## 2018-12-05 PROCEDURE — 96361 HYDRATE IV INFUSION ADD-ON: CPT

## 2018-12-05 PROCEDURE — A9270 NON-COVERED ITEM OR SERVICE: HCPCS | Mod: GY | Performed by: NURSE PRACTITIONER

## 2018-12-05 RX ORDER — ACETAMINOPHEN 500 MG
500 TABLET ORAL EVERY 4 HOURS PRN
Status: DISCONTINUED | OUTPATIENT
Start: 2018-12-05 | End: 2018-12-05 | Stop reason: HOSPADM

## 2018-12-05 RX ORDER — ONDANSETRON 4 MG/1
4 TABLET, ORALLY DISINTEGRATING ORAL EVERY 8 HOURS PRN
Qty: 10 TABLET | Refills: 0 | Status: SHIPPED | OUTPATIENT
Start: 2018-12-05 | End: 2018-12-08

## 2018-12-05 RX ORDER — ONDANSETRON 2 MG/ML
4 INJECTION INTRAMUSCULAR; INTRAVENOUS EVERY 30 MIN PRN
Status: DISCONTINUED | OUTPATIENT
Start: 2018-12-05 | End: 2018-12-05 | Stop reason: HOSPADM

## 2018-12-05 RX ORDER — IOPAMIDOL 755 MG/ML
100 INJECTION, SOLUTION INTRAVASCULAR ONCE
Status: COMPLETED | OUTPATIENT
Start: 2018-12-05 | End: 2018-12-05

## 2018-12-05 RX ADMIN — SODIUM CHLORIDE 1000 ML: 9 INJECTION, SOLUTION INTRAVENOUS at 13:25

## 2018-12-05 RX ADMIN — SODIUM CHLORIDE 65 ML: 9 INJECTION, SOLUTION INTRAVENOUS at 14:01

## 2018-12-05 RX ADMIN — ONDANSETRON 4 MG: 2 INJECTION INTRAMUSCULAR; INTRAVENOUS at 13:26

## 2018-12-05 RX ADMIN — IOPAMIDOL 100 ML: 755 INJECTION, SOLUTION INTRAVENOUS at 14:01

## 2018-12-05 RX ADMIN — ACETAMINOPHEN 500 MG: 500 TABLET, FILM COATED ORAL at 15:11

## 2018-12-05 ASSESSMENT — ENCOUNTER SYMPTOMS
BLOOD IN STOOL: 0
RHINORRHEA: 1
COUGH: 1
DIARRHEA: 1
NAUSEA: 1
VOMITING: 1
DIZZINESS: 1

## 2018-12-05 NOTE — ED PROVIDER NOTES
History     Chief Complaint:  Nausea, Vomiting, & Diarrhea    HPI   Michelle Pedersen is a 68 year old female who presents to the emergency department for evaluation of nausea, vomiting, diarrhea. The patient reports she first developed cough and rhinorrhea on 12/1. She took Tylenol the next day but began experiencing vomiting and diarrhea (multiple episodes of both). All of her symptoms have persisted to today, and she has also developed diffuse cramping abdominal pain and dizziness, with dizziness worsening with positional changes. She does have a history of GI bleed, last in February of this year, but denies blood in stool or vomit or dark, tarry stools.  She denies NSAID use, chest pain, palpitations, shortness of breath, fevers or chills.  No recent travel outside the [place]. No recent antibiotic use.     Allergies:  Penicillins     Medications:    Arimidex  Caltrate  Celexa  Levothyroxine  Melatonin  Protonix  Zocor     Past Medical History:    GI bleed  Breast cancer  Depression   High cholesterol  Hypothyroid     Past Surgical History:    The patient does not have any pertinent past surgical history.    Family History:    Colon cancer    Social History:  Presents with friend.  Lives alone.  Never smoker.  Positive for alcohol use.   Marital Status:   [2]     Review of Systems   HENT: Positive for rhinorrhea.    Respiratory: Positive for cough.    Cardiovascular: Negative for chest pain.   Gastrointestinal: Positive for diarrhea, nausea and vomiting. Negative for blood in stool.   Neurological: Positive for dizziness.   All other systems reviewed and are negative.    Physical Exam     Patient Vitals for the past 24 hrs:   BP Temp Temp src Pulse Resp SpO2 Weight   12/05/18 1510 - - - - - 95 % -   12/05/18 1509 145/79 - - - - - -   12/05/18 1458 - - - - - 96 % -   12/05/18 1457 - - - - - 96 % -   12/05/18 1456 - - - - - 96 % -   12/05/18 1455 - - - - - 96 % -   12/05/18 1454 - - - - - 95 % -   12/05/18 1354 -  - - - - 93 % -   12/05/18 1345 - - - - - 96 % -   12/05/18 1330 - - - - - 91 % -   12/05/18 1240 (!) 151/105 99.1  F (37.3  C) Oral 115 16 97 % 99.8 kg (220 lb)     Physical Exam  Constitutional: Alert, attentive, GCS 15.    HENT: Mucous membranes are moist.   Eyes: EOM are normal. PERRLA. Conjunctiva pink, no scleral icterus or conjunctival injection  CV: tachycardic, regular rhythm; no murmurs, rubs or gallups.  Radial, dorsalis pedis and posterior tibial pulses 2+ bilaterally.  Cap refill <2 seconds.  Respiratory: Effort normal. Lungs clear to auscultation bilaterally. No crackles/rubs/wheezes.  Good air movement.  GI:  Tenderness with palpation in LLQ with guarding; no rebound. No distension. Normal bowel sounds.  MSK: Normal range of motion. No peripheral edema or calf tenderness.  Neurological: Alert, attentive.  Skin: Skin is warm and dry.  No rashes or petechiae.  Psychiatric: Normal affect.      Emergency Department Course     Imaging:  Radiographic findings were communicated with the patient who voiced understanding of the findings.    CT Abdomen/Pelvis with IV contrast:   No evidence of bowel obstruction or diverticulitis.  Appendix not visualized, but no inflammation is noted about the cecum.  No urinary tract calculi or hydronephrosis. No acute changes to  account for patient's symptoms. As per radiology.    Laboratory:  CBC: WBC: 8.7, HGB: 14.6, PLT: 191  CMP: Glucose 119 (H), Albumin 3.3 (L), o/w WNL     Lipase: 89    Creatinine POCT: 0.7, GFR Estimate 83    Interventions:  1325 NS 1L IV BOLUS  1326 Zofran 4 mg IV  1511 Tylenol 500 mg PO    Emergency Department Course:  Nursing notes and vitals reviewed. 1254 I performed an exam of the patient as documented above.     IV inserted. Medicine administered as documented above. Blood drawn. This was sent to the lab for further testing, results above.    The patient was sent for a CT Abdomen Pelvis while in the emergency department, findings above.      1504 I rechecked the patient and discussed the results of her workup thus far. Repeat abdominal exam benign without marked tenderness, rebound or guarding.    Findings and plan explained to the Patient. Patient discharged home with instructions regarding supportive care, medications, and reasons to return. The importance of close follow-up was reviewed. The patient was prescribed Zofran.    I personally reviewed the laboratory results with the Patient and answered all related questions prior to discharge.     Impression & Plan      Medical Decision Making:  Michelle Pedersen is a 68 year old female who presents for evaluation of vomiting and diarrhea. I considered a broad differential diagnosis including viral gastroenteritis, bacterial infection of the large intestine (salmonella, shigella, campylobacter, e coli, etc), bowel obstruction, food poisoning, intra-abdominal infection such as diverticulitis, colitis, cholecystitis, UTI, pyelonephritis, etc.  Given localized LLQ abdominal tenderness, an abdominal CT was obtained that was negative for acute intraabdominal findings.  She was initially tachycardic but this, as well as dizzziness, resolved after IVF.  Blood work obtained and reassuring without leukocytosis or significant electrolyte abnormalities.  She was able to tolerated PO fluids after zofran and felt well enough to return home.  Supportive outpatient management is therefore indicated with close follow-up with PCP tomorrow.   No indication for stool studies at this time.  No indication for CT or hospitalization for serial exams at this time.  It was discussed with the patients to return to the ED for blood in stool or vomit, fevers, no urine output every 6 hour, increasing abdominal pain, or any further concerns.    Diagnosis:    ICD-10-CM   1. Nausea vomiting and diarrhea R11.2    R19.7       Disposition:  discharged to home    Discharge Medications:  Discharge Medication List as of 12/5/2018  3:14 PM       START taking these medications    Details   ondansetron (ZOFRAN ODT) 4 MG ODT tab Take 1 tablet (4 mg) by mouth every 8 hours as needed for nausea, Disp-10 tablet, R-0, Local Print           I, Can Pierce, am serving as a scribe on 12/5/2018 at 12:48 PM to personally document services performed by Manjula Olivares APRN * based on my observations and the provider's statements to me.     Can Pierce  12/5/2018   [facility] EMERGENCY DEPARTMENT       Manjula Olivares APRN CNP  12/05/18 [id number]

## 2018-12-05 NOTE — ED AVS SNAPSHOT
[facility] Emergency Department    [address]    [facility] [address]    Phone:  [phone]    Fax:  [phone]                                       Michelle Pedersen   MRN: [id number]    Department:  [facility] Emergency Department   Date of Visit:  12/5/2018           After Visit Summary Signature Page     I have received my discharge instructions, and my questions have been answered. I have discussed any challenges I see with this plan with the nurse or doctor.    ..........................................................................................................................................  Patient/Patient Representative Signature      ..........................................................................................................................................  Patient Representative Print Name and Relationship to Patient    ..................................................               ................................................  Date                                   Time    ..........................................................................................................................................  Reviewed by Signature/Title    ...................................................              ..............................................  Date                                               Time          22EPIC Rev 08/18

## 2018-12-05 NOTE — DISCHARGE INSTRUCTIONS
Discharge Instructions  Adult Diarrhea    You have been seen today for diarrhea (loose stools). This is usually caused by a virus, but some bacteria, parasites, medicines, or other medical conditions can cause similar symptoms. At this time your provider does not find that your diarrhea is a sign of anything dangerous or life-threatening. However, sometimes the signs of serious illness do not show up right away. If you have new or worse symptoms, you may need to be seen again in the Emergency Department or by your primary provider.     Generally, every Emergency Department visit should have a follow-up clinic visit with either a primary or a specialty clinic/provider. Please follow-up as instructed by your emergency provider today.    Return to the Emergency Department if:    You feel you are getting dehydrated, such as being very thirsty, not urinating (peeing) like usual, or feeling faint or lightheaded.     You develop a new fever.    You have abdominal (belly) pain that seems worse than cramps, is in one spot, or is getting worse over time.     You have blood in your stool or your stool becomes black.  (Remember that if you take Pepto-Bismol , this will turn your stool black).     You feel very weak.    What can I do to help myself?    The most important thing to do is to drink clear liquids.   It is best to have only small, frequent sips of liquids. Drinking too much at once may cause more diarrhea. You should also replace minerals, sodium and potassium lost with diarrhea. Pedialyte  and sports drinks can help you replace these minerals. You can also drink clear liquids such as water, weak tea, apple juice, and 7-Up . Avoid acidic liquids (orange juice), caffeine (coffee) or alcohol. Milk products will make the diarrhea worse.    Eat bland (plain) foods. Soda crackers, toast, plain noodles, gelatin, applesauce and bananas are good first choices. Avoid foods that have acid, are spicy, fatty or fibrous (such as  "meats, coarse grains, vegetables). You may start eating these foods again in about 3 days when you are better.     Sometimes treatment includes prescription medicine to prevent diarrhea. If your provider prescribes these for you, take them as directed.     Nonprescription medicine is available for the treatment of diarrhea and can be very effective. If you use it, make sure you use the dose recommended on the package. Check with your healthcare provider before you use any medicine for diarrhea.     Do not take ibuprofen, or other nonsteroidal anti-inflammatory medicines, without checking with your healthcare provider.   Probiotics: If you have been given an antibiotic, you may want to also take a probiotic pill or eat yogurt with live cultures. Probiotics have \"good bacteria\" to help your intestines stay healthy. Studies have shown that probiotics help prevent diarrhea and other intestine problems (including C. diff infection) when you take antibiotics. You can buy these without a prescription in the pharmacy section of the store.   If you were given a prescription for medicine here today, be sure to read all of the information (including the package insert) that comes with your prescription.  This will include important information about the medicine, its side effects, and any warnings that you need to know about.  The pharmacist who fills the prescription can provide more information and answer questions you may have about the medicine.  If you have questions or concerns that the pharmacist cannot address, please call or return to the Emergency Department.  Remember that you can always come back to the Emergency Department if you are not able to see your regular provider in the amount of time listed above, if you get any new symptoms, or if there is anything that worries you.    Discharge Instructions  Vomiting    You have been seen today for vomiting (throwing up). This is usually caused by a virus, but some " bacteria, parasites, medicines or other medical conditions can cause similar symptoms. At this time your provider does not find that your vomiting is a sign of anything dangerous or life-threatening. However, sometimes the signs of serious illness do not show up right away. If you have new or worse symptoms, you may need to be seen again in the Emergency Department or by your primary provider. Remember that serious problems like appendicitis can start as vomiting.    Generally, every Emergency Department visit should have a follow-up clinic visit with either a primary or a specialty clinic/provider. Please follow-up as instructed by your emergency provider today.    Return to the Emergency Department if:    You keep vomiting and you are not able to keep liquids down.     You feel you are getting dehydrated, such as being very thirsty, not urinating (peeing) at least every 8-12 hours, or feeling faint or lightheaded.     You develop a new fever, or your fever continues for more than 2 days.     You have abdominal (belly pain) that seems worse than cramps, is in one spot, or is getting worse over time. Appendicitis usually causes pain in the right lower abdomen (to the right and below your belly button) so watch for pain in this location.    You have blood in your vomit or stools.     You feel very weak.    You are not starting to improve within 24 hours of your visit here.     What can I do to help myself?    The most important thing to do is to drink clear liquids. If you have been vomiting a lot, it is best to have only small, frequent sips of liquids. Drinking too much at once may cause more vomiting. If you are vomiting often, you must replace minerals, sodium and potassium lost with your illness. Pedialyte  is the best available rehydration liquid but some find that it doesn t taste good so sports drinks are an alterative. You can also drink clear liquids such as water, weak tea, apple juice, and 7-Up . Avoid  acid liquids (orange), caffeine (coffee) or alcohol. Do not drink milk until you no longer have diarrhea (loose stools).     After liquids are staying down, you may start eating mild foods. Soda crackers, toast, plain noodles, gelatin, applesauce and bananas are good first choices. Avoid foods that have acid, are spicy, fatty or have a lot of fiber (such as meats, coarse grains, vegetables). You may start eating these foods again in about 3 days when you are better.     Sometimes treatment includes prescription medicine to prevent nausea (sick to your stomach) and vomiting. If your provider prescribes these for you, take them as directed.     Do not take ibuprofen, naproxen, or other nonsteroidal anti-inflammatory (NSAID) medicines without checking with your healthcare provider.     If you were given a prescription for medicine here today, be sure to read all of the information (including the package insert) that comes with your prescription.  This will include important information about the medicine, its side effects, and any warnings that you need to know about.  The pharmacist who fills the prescription can provide more information and answer questions you may have about the medicine.  If you have questions or concerns that the pharmacist cannot address, please call or return to the Emergency Department.     Remember that you can always come back to the Emergency Department if you are not able to see your regular provider in the amount of time listed above, if you get any new symptoms, or if there is anything that worries you.

## 2018-12-05 NOTE — ED AVS SNAPSHOT
[facility] Emergency Department    [address]    [facility] [address]    Phone:  [phone]    Fax:  [phone]                                       Michelle Pedersen   MRN: [id number]    Department:  [facility] Emergency Department   Date of Visit:  12/5/2018           Patient Information     Date Of Birth          1950        Your diagnoses for this visit were:     Nausea vomiting and diarrhea        You were seen by Manjula Olivares, ROSALINDA CNP.      Follow-up Information     Follow up with Vidya Serna NP In 1 day.    Specialty:  Nurse Practitioner    Contact information:    [facility]  [address] [facility] [id number] [phone]          Follow up with [facility] Emergency Department.    Specialty:  EMERGENCY MEDICINE    Why:  As needed, If symptoms worsen    Contact information:    [address]  [facility] [id number] [phone]        Discharge Instructions         Discharge Instructions  Adult Diarrhea    You have been seen today for diarrhea (loose stools). This is usually caused by a virus, but some bacteria, parasites, medicines, or other medical conditions can cause similar symptoms. At this time your provider does not find that your diarrhea is a sign of anything dangerous or life-threatening. However, sometimes the signs of serious illness do not show up right away. If you have new or worse symptoms, you may need to be seen again in the Emergency Department or by your primary provider.     Generally, every Emergency Department visit should have a follow-up clinic visit with either a primary or a specialty clinic/provider. Please follow-up as instructed by your emergency provider today.    Return to the Emergency Department if:    You feel you are getting dehydrated, such as being very thirsty, not urinating (peeing) like usual, or feeling faint or lightheaded.     You develop a new fever.    You have  "abdominal (belly) pain that seems worse than cramps, is in one spot, or is getting worse over time.     You have blood in your stool or your stool becomes black.  (Remember that if you take Pepto-Bismol , this will turn your stool black).     You feel very weak.    What can I do to help myself?    The most important thing to do is to drink clear liquids.   It is best to have only small, frequent sips of liquids. Drinking too much at once may cause more diarrhea. You should also replace minerals, sodium and potassium lost with diarrhea. Pedialyte  and sports drinks can help you replace these minerals. You can also drink clear liquids such as water, weak tea, apple juice, and 7-Up . Avoid acidic liquids (orange juice), caffeine (coffee) or alcohol. Milk products will make the diarrhea worse.    Eat bland (plain) foods. Soda crackers, toast, plain noodles, gelatin, applesauce and bananas are good first choices. Avoid foods that have acid, are spicy, fatty or fibrous (such as meats, coarse grains, vegetables). You may start eating these foods again in about 3 days when you are better.     Sometimes treatment includes prescription medicine to prevent diarrhea. If your provider prescribes these for you, take them as directed.     Nonprescription medicine is available for the treatment of diarrhea and can be very effective. If you use it, make sure you use the dose recommended on the package. Check with your healthcare provider before you use any medicine for diarrhea.     Do not take ibuprofen, or other nonsteroidal anti-inflammatory medicines, without checking with your healthcare provider.   Probiotics: If you have been given an antibiotic, you may want to also take a probiotic pill or eat yogurt with live cultures. Probiotics have \"good bacteria\" to help your intestines stay healthy. Studies have shown that probiotics help prevent diarrhea and other intestine problems (including C. diff infection) when you take " antibiotics. You can buy these without a prescription in the pharmacy section of the store.   If you were given a prescription for medicine here today, be sure to read all of the information (including the package insert) that comes with your prescription.  This will include important information about the medicine, its side effects, and any warnings that you need to know about.  The pharmacist who fills the prescription can provide more information and answer questions you may have about the medicine.  If you have questions or concerns that the pharmacist cannot address, please call or return to the Emergency Department.  Remember that you can always come back to the Emergency Department if you are not able to see your regular provider in the amount of time listed above, if you get any new symptoms, or if there is anything that worries you.    Discharge Instructions  Vomiting    You have been seen today for vomiting (throwing up). This is usually caused by a virus, but some bacteria, parasites, medicines or other medical conditions can cause similar symptoms. At this time your provider does not find that your vomiting is a sign of anything dangerous or life-threatening. However, sometimes the signs of serious illness do not show up right away. If you have new or worse symptoms, you may need to be seen again in the Emergency Department or by your primary provider. Remember that serious problems like appendicitis can start as vomiting.    Generally, every Emergency Department visit should have a follow-up clinic visit with either a primary or a specialty clinic/provider. Please follow-up as instructed by your emergency provider today.    Return to the Emergency Department if:    You keep vomiting and you are not able to keep liquids down.     You feel you are getting dehydrated, such as being very thirsty, not urinating (peeing) at least every 8-12 hours, or feeling faint or lightheaded.     You develop a new fever,  or your fever continues for more than 2 days.     You have abdominal (belly pain) that seems worse than cramps, is in one spot, or is getting worse over time. Appendicitis usually causes pain in the right lower abdomen (to the right and below your belly button) so watch for pain in this location.    You have blood in your vomit or stools.     You feel very weak.    You are not starting to improve within 24 hours of your visit here.     What can I do to help myself?    The most important thing to do is to drink clear liquids. If you have been vomiting a lot, it is best to have only small, frequent sips of liquids. Drinking too much at once may cause more vomiting. If you are vomiting often, you must replace minerals, sodium and potassium lost with your illness. Pedialyte  is the best available rehydration liquid but some find that it doesn t taste good so sports drinks are an alterative. You can also drink clear liquids such as water, weak tea, apple juice, and 7-Up . Avoid acid liquids (orange), caffeine (coffee) or alcohol. Do not drink milk until you no longer have diarrhea (loose stools).     After liquids are staying down, you may start eating mild foods. Soda crackers, toast, plain noodles, gelatin, applesauce and bananas are good first choices. Avoid foods that have acid, are spicy, fatty or have a lot of fiber (such as meats, coarse grains, vegetables). You may start eating these foods again in about 3 days when you are better.     Sometimes treatment includes prescription medicine to prevent nausea (sick to your stomach) and vomiting. If your provider prescribes these for you, take them as directed.     Do not take ibuprofen, naproxen, or other nonsteroidal anti-inflammatory (NSAID) medicines without checking with your healthcare provider.     If you were given a prescription for medicine here today, be sure to read all of the information (including the package insert) that comes with your prescription.   This will include important information about the medicine, its side effects, and any warnings that you need to know about.  The pharmacist who fills the prescription can provide more information and answer questions you may have about the medicine.  If you have questions or concerns that the pharmacist cannot address, please call or return to the Emergency Department.     Remember that you can always come back to the Emergency Department if you are not able to see your regular provider in the amount of time listed above, if you get any new symptoms, or if there is anything that worries you.      24 Hour Appointment Hotline       To make an appointment at any [facility], call [phone] ([phone]). If you don't have a family doctor or clinic, we will help you find one. [facility] are conveniently located to serve the needs of you and your family.             Review of your medicines      START taking        Dose / Directions Last dose taken    ondansetron 4 MG ODT tab   Commonly known as:  ZOFRAN ODT   Dose:  4 mg   Quantity:  10 tablet        Take 1 tablet (4 mg) by mouth every 8 hours as needed for nausea   Refills:  0          Our records show that you are taking the medicines listed below. If these are incorrect, please call your family doctor or clinic.        Dose / Directions Last dose taken    anastrozole 1 MG tablet   Commonly known as:  ARIMIDEX   Dose:  1 mg        Take 1 mg by mouth daily   Refills:  0        calcium carbonate 600 mg-vitamin D 400 units 600-400 MG-UNIT per tablet   Commonly known as:  CALTRATE   Dose:  1 tablet        Take 1 tablet by mouth daily   Refills:  0        citalopram 20 MG tablet   Commonly known as:  celeXA   Dose:  20 mg        Take 20 mg by mouth daily.   Refills:  0        fish oil-omega-3 fatty acids 1000 MG capsule   Dose:  1 g        Take 1 g by mouth daily   Refills:  0        Glucosamine-Chondroitin 250-200 MG Tabs   Dose:  1 tablet        Take 1  tablet by mouth daily   Refills:  0        levothyroxine 100 MCG tablet   Commonly known as:  SYNTHROID/LEVOTHROID   Dose:  100 mcg        Take 100 mcg by mouth daily.   Refills:  0        melatonin 5 MG tablet   Dose:  5 mg        Take 5 mg by mouth nightly as needed for sleep   Refills:  0        pantoprazole 40 MG EC tablet   Commonly known as:  PROTONIX   Dose:  40 mg   Quantity:  30 tablet        Take 1 tablet (40 mg) by mouth daily   Refills:  0        simvastatin 20 MG tablet   Commonly known as:  ZOCOR   Dose:  40 mg        Take 40 mg by mouth At Bedtime   Refills:  0        Vitamin D-3 1000 units Caps   Dose:  2000 Units        Take 2,000 Units by mouth daily   Refills:  0                Prescriptions were sent or printed at these locations (1 Prescription)                   Other Prescriptions                Printed at Department/Unit printer (1 of 1)         ondansetron (ZOFRAN ODT) 4 MG ODT tab                Procedures and tests performed during your visit     CBC with platelets differential    CT Abdomen Pelvis w Contrast    Comprehensive metabolic panel    Creatinine POCT    Lipase    Peripheral IV catheter      Orders Needing Specimen Collection     None      Pending Results     No orders found from 12/3/2018 to 12/6/2018.            Pending Culture Results     No orders found from 12/3/2018 to 12/6/2018.            Pending Results Instructions     If you had any lab results that were not finalized at the time of your Discharge, you can call the ED Lab Result RN at [phone]. You will be contacted by this team for any positive Lab results or changes in treatment. The nurses are available 7 days a week from 10A to 6:30P.  You can leave a message 24 hours per day and they will return your call.        Test Results From Your Hospital Stay        12/5/2018  2:13 PM      Component Results     Component Value Ref Range & Units Status    Sodium 140 133 - 144 mmol/L Final    Potassium 4.3 3.4 - 5.3 mmol/L  Final    Chloride 107 94 - 109 mmol/L Final    Carbon Dioxide 27 20 - 32 mmol/L Final    Anion Gap 6 3 - 14 mmol/L Final    Glucose 119 (H) 70 - 99 mg/dL Final    Urea Nitrogen 14 7 - 30 mg/dL Final    Creatinine 0.66 0.52 - 1.04 mg/dL Final    GFR Estimate 89 >60 mL/min/1.7m2 Final    Non  GFR Calc    GFR Estimate If Black >90 >60 mL/min/1.7m2 Final    African American GFR Calc    Calcium 8.7 8.5 - 10.1 mg/dL Final    Bilirubin Total 0.4 0.2 - 1.3 mg/dL Final    Albumin 3.3 (L) 3.4 - 5.0 g/dL Final    Protein Total 7.2 6.8 - 8.8 g/dL Final    Alkaline Phosphatase 85 40 - 150 U/L Final    ALT 17 0 - 50 U/L Final    AST 13 0 - 45 U/L Final         12/5/2018  2:13 PM      Component Results     Component Value Ref Range & Units Status    Lipase 89 73 - 393 U/L Final         12/5/2018  1:52 PM      Component Results     Component Value Ref Range & Units Status    WBC 8.7 4.0 - 11.0 10e9/L Final    RBC Count 4.95 3.8 - 5.2 10e12/L Final    Hemoglobin 14.6 11.7 - 15.7 g/dL Final    Hematocrit 46.7 35.0 - 47.0 % Final    MCV 94 78 - 100 fl Final    MCH 29.5 26.5 - 33.0 pg Final    MCHC 31.3 (L) 31.5 - 36.5 g/dL Final    RDW 14.1 10.0 - 15.0 % Final    Platelet Count 191 150 - 450 10e9/L Final    Diff Method Automated Method  Final    % Neutrophils 90.1 % Final    % Lymphocytes 3.6 % Final    % Monocytes 5.4 % Final    % Eosinophils 0.2 % Final    % Basophils 0.1 % Final    % Immature Granulocytes 0.6 % Final    Nucleated RBCs 0 0 /100 Final    Absolute Neutrophil 7.8 1.6 - 8.3 10e9/L Final    Absolute Lymphocytes 0.3 (L) 0.8 - 5.3 10e9/L Final    Absolute Monocytes 0.5 0.0 - 1.3 10e9/L Final    Absolute Eosinophils 0.0 0.0 - 0.7 10e9/L Final    Absolute Basophils 0.0 0.0 - 0.2 10e9/L Final    Abs Immature Granulocytes 0.1 0 - 0.4 10e9/L Final    Absolute Nucleated RBC 0.0  Final         12/5/2018  2:34 PM      Narrative     CT ABDOMEN AND PELVIS WITH CONTRAST  12/5/2018 2:09 PM     HISTORY: Left lower  quadrant pain.      COMPARISON: CT abdomen and pelvis 2/20/2018.    TECHNIQUE: Axial images from the lung bases to the symphysis are  performed with additional coronal reformatted images. 100 mL of Isovue  370 are given intravenously.  Radiation dose for this scan was reduced  using automated exposure control, adjustment of the mA and/or kV  according to patient size, or iterative reconstruction technique.     FINDINGS:  The lung bases are clear.    Abdomen: The upper abdominal organs are within normal limits including  the liver, spleen, gallbladder, pancreas, adrenal glands and kidneys.  No hydronephrosis or urinary tract calculi. The bowel is normal in  caliber without obstruction or diverticulitis. Appendix is not  definitely identified.    Pelvis: The bladder, uterus, ovaries and rectum are unremarkable. No  enlarged pelvic or inguinal lymph nodes. No free pelvic fluid. Bone  window examination demonstrates degenerative spine changes.        Impression     IMPRESSION: No evidence of bowel obstruction or diverticulitis.  Appendix not visualized, but no inflammation is noted about the cecum.  No urinary tract calculi or hydronephrosis. No acute changes to  account for patient's symptoms.    JC GAUTAM MD         12/5/2018  1:37 PM      Component Results     Component Value Ref Range & Units Status    Creatinine 0.7 0.52 - 1.04 mg/dL Final    GFR Estimate 83 >60 mL/min/1.7m2 Final    GFR Estimate If Black >90 >60 mL/min/1.7m2 Final                Clinical Quality Measure: Blood Pressure Screening     Your blood pressure was checked while you were in the emergency department today. The last reading we obtained was  BP: (!) 151/105 . Please read the guidelines below about what these numbers mean and what you should do about them.  If your systolic blood pressure (the top number) is less than 120 and your diastolic blood pressure (the bottom number) is less than 80, then your blood pressure is normal. There is  "nothing more that you need to do about it.  If your systolic blood pressure (the top number) is 120-139 or your diastolic blood pressure (the bottom number) is 80-89, your blood pressure may be higher than it should be. You should have your blood pressure rechecked within a year by a primary care provider.  If your systolic blood pressure (the top number) is 140 or greater or your diastolic blood pressure (the bottom number) is 90 or greater, you may have high blood pressure. High blood pressure is treatable, but if left untreated over time it can put you at risk for heart attack, stroke, or kidney failure. You should have your blood pressure rechecked by a primary care provider within the next 4 weeks.  If your provider in the emergency department today gave you specific instructions to follow-up with your doctor or provider even sooner than that, you should follow that instruction and not wait for up to 4 weeks for your follow-up visit.        Thank you for choosing [facility]       Thank you for choosing [facility] for your care. Our goal is always to provide you with excellent care. Hearing back from our patients is one way we can continue to improve our services. Please take a few minutes to complete the written survey that you may receive in the mail after you visit with us. Thank you!        [employer] Information     [employer] lets you send messages to your doctor, view your test results, renew your prescriptions, schedule appointments and more. To sign up, go to [email or web address] . Click on \"Log in\" on the left side of the screen, which will take you to the Welcome page. Then click on \"Sign up Now\" on the right side of the page.     You will be asked to enter the access code listed below, as well as some personal information. Please follow the directions to create your username and password.     Your access code is: [email or web address]  Expires: 3/5/2019  3:14 PM     Your access code will  in 90 days. If you " need help or a new code, please call your [facility] clinic or [phone].        Care EveryWhere ID     This is your Care EveryWhere ID. This could be used by other organizations to access your [facility] medical records  [id number]        Equal Access to Services     NINA HALLMAN : Demetra Delcid, annel tafoya, peyton sullivanallatrice mcduffie, eric gonsales. So [facility] [phone].    ATENCIÓN: Si habla español, tiene a ascencio disposición servicios gratuitos de asistencia lingüística. Llame al [phone].    We comply with applicable federal civil rights laws and [place] laws. We do not discriminate on the basis of race, color, national origin, age, disability, sex, sexual orientation, or gender identity.            After Visit Summary       This is your record. Keep this with you and show to your community pharmacist(s) and doctor(s) at your next visit.

## 2018-12-05 NOTE — ED TRIAGE NOTES
Pt having cold symptoms with cough since Saturday.  Last night pt developed nausea, vomiting and diarrhea.

## 2018-12-12 ENCOUNTER — HOSPITAL ENCOUNTER (INPATIENT)
Facility: CLINIC | Age: 68
LOS: 2 days | Discharge: HOME | DRG: 378 | End: 2018-12-14
Attending: EMERGENCY MEDICINE | Admitting: INTERNAL MEDICINE
Payer: MEDICARE

## 2018-12-12 ENCOUNTER — TRANSFERRED RECORDS (OUTPATIENT)
Dept: HEALTH INFORMATION MANAGEMENT | Facility: CLINIC | Age: 68
End: 2018-12-12

## 2018-12-12 DIAGNOSIS — D64.9 ANEMIA, UNSPECIFIED TYPE: ICD-10-CM

## 2018-12-12 DIAGNOSIS — K92.2 ACUTE GI BLEEDING: ICD-10-CM

## 2018-12-12 PROBLEM — K92.1 MELENA: Status: ACTIVE | Noted: 2018-12-12

## 2018-12-12 LAB
ABO + RH BLD: NORMAL
ABO + RH BLD: NORMAL
ALBUMIN SERPL-MCNC: 3.1 G/DL (ref 3.4–5)
ALP SERPL-CCNC: 71 U/L (ref 40–150)
ALT SERPL W P-5'-P-CCNC: 16 U/L (ref 0–50)
ANION GAP SERPL CALCULATED.3IONS-SCNC: 6 MMOL/L (ref 3–14)
APTT PPP: 30 SEC (ref 22–37)
AST SERPL W P-5'-P-CCNC: 11 U/L (ref 0–45)
BASOPHILS # BLD AUTO: 0.1 10E9/L (ref 0–0.2)
BASOPHILS NFR BLD AUTO: 0.7 %
BILIRUB SERPL-MCNC: 0.3 MG/DL (ref 0.2–1.3)
BLD GP AB SCN SERPL QL: NORMAL
BLOOD BANK CMNT PATIENT-IMP: NORMAL
BUN SERPL-MCNC: 12 MG/DL (ref 7–30)
CALCIUM SERPL-MCNC: 8.4 MG/DL (ref 8.5–10.1)
CHLORIDE SERPL-SCNC: 106 MMOL/L (ref 94–109)
CO2 SERPL-SCNC: 28 MMOL/L (ref 20–32)
CREAT SERPL-MCNC: 0.68 MG/DL (ref 0.52–1.04)
DIFFERENTIAL METHOD BLD: ABNORMAL
EOSINOPHIL # BLD AUTO: 0.1 10E9/L (ref 0–0.7)
EOSINOPHIL NFR BLD AUTO: 0.8 %
ERYTHROCYTE [DISTWIDTH] IN BLOOD BY AUTOMATED COUNT: 14.9 % (ref 10–15)
GFR SERPL CREATININE-BSD FRML MDRD: 86 ML/MIN/1.7M2
GLUCOSE SERPL-MCNC: 97 MG/DL (ref 70–99)
HCT VFR BLD AUTO: 32.9 % (ref 35–47)
HEMOCCULT STL QL: POSITIVE
HGB BLD-MCNC: 10.2 G/DL (ref 11.7–15.7)
HGB BLD-MCNC: 10.5 G/DL (ref 11.7–15.7)
IMM GRANULOCYTES # BLD: 0.4 10E9/L (ref 0–0.4)
IMM GRANULOCYTES NFR BLD: 4.3 %
INR PPP: 1.02 (ref 0.86–1.14)
LACTATE BLD-SCNC: 0.7 MMOL/L (ref 0.7–2)
LYMPHOCYTES # BLD AUTO: 1.4 10E9/L (ref 0.8–5.3)
LYMPHOCYTES NFR BLD AUTO: 15.8 %
MAGNESIUM SERPL-MCNC: 2 MG/DL (ref 1.6–2.3)
MCH RBC QN AUTO: 30.3 PG (ref 26.5–33)
MCHC RBC AUTO-ENTMCNC: 31.9 G/DL (ref 31.5–36.5)
MCV RBC AUTO: 95 FL (ref 78–100)
MONOCYTES # BLD AUTO: 0.6 10E9/L (ref 0–1.3)
MONOCYTES NFR BLD AUTO: 6.7 %
NEUTROPHILS # BLD AUTO: 6.3 10E9/L (ref 1.6–8.3)
NEUTROPHILS NFR BLD AUTO: 70.7 %
NRBC # BLD AUTO: 0 10*3/UL
NRBC BLD AUTO-RTO: 1 /100
PLATELET # BLD AUTO: 259 10E9/L (ref 150–450)
POTASSIUM SERPL-SCNC: 3.9 MMOL/L (ref 3.4–5.3)
PROT SERPL-MCNC: 6.8 G/DL (ref 6.8–8.8)
RBC # BLD AUTO: 3.46 10E12/L (ref 3.8–5.2)
SODIUM SERPL-SCNC: 140 MMOL/L (ref 133–144)
SPECIMEN EXP DATE BLD: NORMAL
WBC # BLD AUTO: 8.8 10E9/L (ref 4–11)

## 2018-12-12 PROCEDURE — 86900 BLOOD TYPING SEROLOGIC ABO: CPT | Performed by: EMERGENCY MEDICINE

## 2018-12-12 PROCEDURE — 86850 RBC ANTIBODY SCREEN: CPT | Performed by: EMERGENCY MEDICINE

## 2018-12-12 PROCEDURE — 85610 PROTHROMBIN TIME: CPT | Performed by: EMERGENCY MEDICINE

## 2018-12-12 PROCEDURE — 25000128 H RX IP 250 OP 636: Performed by: INTERNAL MEDICINE

## 2018-12-12 PROCEDURE — 82272 OCCULT BLD FECES 1-3 TESTS: CPT | Performed by: EMERGENCY MEDICINE

## 2018-12-12 PROCEDURE — 93005 ELECTROCARDIOGRAM TRACING: CPT

## 2018-12-12 PROCEDURE — 83605 ASSAY OF LACTIC ACID: CPT | Performed by: EMERGENCY MEDICINE

## 2018-12-12 PROCEDURE — 36415 COLL VENOUS BLD VENIPUNCTURE: CPT | Performed by: INTERNAL MEDICINE

## 2018-12-12 PROCEDURE — 85730 THROMBOPLASTIN TIME PARTIAL: CPT | Performed by: EMERGENCY MEDICINE

## 2018-12-12 PROCEDURE — 99221 1ST HOSP IP/OBS SF/LOW 40: CPT | Performed by: INTERNAL MEDICINE

## 2018-12-12 PROCEDURE — 96361 HYDRATE IV INFUSION ADD-ON: CPT

## 2018-12-12 PROCEDURE — 85025 COMPLETE CBC W/AUTO DIFF WBC: CPT | Performed by: EMERGENCY MEDICINE

## 2018-12-12 PROCEDURE — C9113 INJ PANTOPRAZOLE SODIUM, VIA: HCPCS | Performed by: EMERGENCY MEDICINE

## 2018-12-12 PROCEDURE — 99207 ZZC CDG-HISTORY COMP: MEETS EXP. PROB FOCUSED- DOWN CODED LACK OF PFSH: CPT | Performed by: INTERNAL MEDICINE

## 2018-12-12 PROCEDURE — 86901 BLOOD TYPING SEROLOGIC RH(D): CPT | Performed by: EMERGENCY MEDICINE

## 2018-12-12 PROCEDURE — 25000128 H RX IP 250 OP 636: Performed by: EMERGENCY MEDICINE

## 2018-12-12 PROCEDURE — 83735 ASSAY OF MAGNESIUM: CPT | Performed by: EMERGENCY MEDICINE

## 2018-12-12 PROCEDURE — 85018 HEMOGLOBIN: CPT | Performed by: INTERNAL MEDICINE

## 2018-12-12 PROCEDURE — 99285 EMERGENCY DEPT VISIT HI MDM: CPT | Mod: 25

## 2018-12-12 PROCEDURE — 96374 THER/PROPH/DIAG INJ IV PUSH: CPT

## 2018-12-12 PROCEDURE — 12000000 ZZH R&B MED SURG/OB

## 2018-12-12 PROCEDURE — 80053 COMPREHEN METABOLIC PANEL: CPT | Performed by: EMERGENCY MEDICINE

## 2018-12-12 PROCEDURE — C9113 INJ PANTOPRAZOLE SODIUM, VIA: HCPCS | Performed by: INTERNAL MEDICINE

## 2018-12-12 RX ORDER — SODIUM CHLORIDE 9 MG/ML
INJECTION, SOLUTION INTRAVENOUS CONTINUOUS
Status: DISCONTINUED | OUTPATIENT
Start: 2018-12-12 | End: 2018-12-14 | Stop reason: HOSPADM

## 2018-12-12 RX ORDER — NALOXONE HYDROCHLORIDE 0.4 MG/ML
.1-.4 INJECTION, SOLUTION INTRAMUSCULAR; INTRAVENOUS; SUBCUTANEOUS
Status: DISCONTINUED | OUTPATIENT
Start: 2018-12-12 | End: 2018-12-14 | Stop reason: HOSPADM

## 2018-12-12 RX ADMIN — PANTOPRAZOLE SODIUM 40 MG: 40 INJECTION, POWDER, FOR SOLUTION INTRAVENOUS at 17:12

## 2018-12-12 RX ADMIN — SODIUM CHLORIDE 8 MG/HR: 9 INJECTION, SOLUTION INTRAVENOUS at 21:25

## 2018-12-12 RX ADMIN — SODIUM CHLORIDE: 9 INJECTION, SOLUTION INTRAVENOUS at 20:30

## 2018-12-12 RX ADMIN — SODIUM CHLORIDE 1000 ML: 9 INJECTION, SOLUTION INTRAVENOUS at 18:53

## 2018-12-12 RX ADMIN — SODIUM CHLORIDE 80 MG: 9 INJECTION, SOLUTION INTRAVENOUS at 20:31

## 2018-12-12 ASSESSMENT — ENCOUNTER SYMPTOMS
VOMITING: 0
NAUSEA: 0
DIARRHEA: 1
BLOOD IN STOOL: 1
ABDOMINAL PAIN: 0
PALPITATIONS: 0

## 2018-12-12 NOTE — ED TRIAGE NOTES
Patient presents with complaints of bloody stools which started on Monday. Patient states that stools are dark. She has history of previous GI bleeds in the past. Patients stool was positive for blood and her Hgb was 11.2 at the clinic today. Denies any dizziness or lightheadedness. . ABC intact without need for intervention at this time.

## 2018-12-12 NOTE — ED PROVIDER NOTES
History     Chief Complaint:  Rectal bleeding     HPI   Michelle Pedersen is a 68 year old female, with a history of GI bleed, hypothyroid, and hyperlipidemia, who presents with rectal bleeding and dark stools. The patient states that she was seen in the ED last week for vomiting and diarrhea. She had received IV fluids and was discharged home in an improved status. At home, she notes that she had been eating a spinach diet and felt improved. On 12/10, the patient states that she had one episode of dark solid stools, but attributed it to her spinach diet. The next day, she notes that she had another episode of loose dark stools after taking Dulcolax. She had visited her primary care, [facility], who had performed a rectal exam and was noted for occult blood as well as a decrease in hemoglobin from 14 to 11 over one week. She was then referred to the ED. Here, the patient states that she has been feeling a generalized weakness and ill sensation, but denies any chest pain, palpitations, vomiting, epistaxis or unknown bruising. She additionally denies daily alcohol use or NSAID use.       Allergies:  Penicillins      Medications:    Anastrozole  Celexa  Levothyroxine   Protonix  Zocor    Past Medical History:    Breast cancer  Depression  Hyperlipidemia  Hypothyroid  GI bleed    Past Surgical History:    EGD combined x2  Appendectomy   section x2    Family History:    Colon cancer    Social History:  Positive for alcohol use.    Negative for tobacco use.   Marital Status:   [2]     Review of Systems   HENT: Negative for nosebleeds.    Cardiovascular: Negative for chest pain and palpitations.   Gastrointestinal: Positive for blood in stool and diarrhea. Negative for abdominal pain, nausea and vomiting.   All other systems reviewed and are negative.      Physical Exam   First Vitals:  BP: (!) 176/108  Pulse: 107  Heart Rate: 107  Temp: 97.8  F (36.6  C)  Resp: 20  Weight: 99.8 kg (220  lb)  SpO2: 98 %    Physical Exam  General: Elderly female sitting upright  Eyes: PERRL, Conjunctive pink. No scleral icterus.  ENT: Moist mucous membranes, oropharynx clear.   CV: Normal S1S2, no murmur, rub or gallop. Regular rate and rhythm  Resp: Clear to auscultation bilaterally, no wheezes, rales or rhonchi. Normal respiratory effort.  GI: Abdomen is soft, nontender and nondistended. No palpable masses. No rebound or guarding. Rectal exam shows black stool in the rectal vault without tenderness or palpable mass.  MSK: No edema. Nontender. Normal active range of motion.  Skin: Warm and dry. No rashes or lesions or ecchymoses on visible skin.  Neuro: Alert and oriented. Responds appropriately to all questions and commands. No focal findings appreciated. Normal muscle tone.  Psych: Normal mood and affect. Pleasant.    Emergency Department Course   ECG:  Indication: rectal bleeding  Time: 1653  Vent. Rate 90 bpm. VA interval 204. QRS duration 100. QT/QTc 414/506. P-R-T axis 57 -53 50. Normal sinus rhythm. Left axis deviation. Minimal voltage criteria for LVH, may be normal variant. Prolonged QT. Abnormal ECG. Read time: 1658.      Laboratory:  CBC: WBC: 8.8, HGB: 10.5, PLT: 259  CMP: Albumin: 3.1, calcium: 8.4 o/w WNL (Creatinine: 0.68)    Stool: occult blood: positive  Lactic acid: 0.7  INR: 1.02  ABO/Rh: A+  Magnesium: ordered  Partial Thromboplastin Time: 30    Interventions:  1712 Protonix, 40 mg, IV   1853 0.9% Sodium Chloride Bolus, 1L, IV     Emergency Department Course:  Nursing notes and vitals reviewed. EKG was obtained, findings above.      1633  I performed an exam of the patient as documented above.     IV inserted. Medicine administered as documented above. Blood drawn. This was sent to the lab for further testing, results above.    1802 I rechecked the patient and discussed the results of her workup thus far. She denies any new concerns. Family is now in the room.     1832  I consulted with   Chet of the hospitalist services. They are in agreement to accept the patient for admission.    Findings and plan explained to the Patient who consents to admission. Discussed the patient with Dr. Rosenberg, who will admit the patient to a medical bed for further monitoring, evaluation, and treatment.       Impression & Plan      Medical Decision Making:  Michelle Pedersen is a 68 year old female with a previous history of GI bleed with unclear source presents with concerns for black tarry stools. This is in the setting of recent nausea, vomiting, and diarrhea. That seemed to improve. Please see earlier ED note for details on that visit. She, today, had no abdominal pain or tenderness. She is anemic, but not hemodynamically unstable. There is no indication of emergent transfusion, but to be admitted for serial hemoglobins and further GI assessment of etiology of the bleeding which seems upper in nature. She was given Protonix and IV fluids, but had no other intervention in the emergency department aside from close observation. I discussed the plan with her and verbalized understanding. All questions answered prior to admission    Diagnosis:    ICD-10-CM    1. Acute GI bleeding K92.2    2. Anemia, unspecified type D64.9        Disposition:  Admitted to Dr. Rosenberg of the hospitalist service to a medical bed in stable condition.    I, Veronica Benavides, am serving as a scribe on 12/12/2018 at 4:56 PM to personally document services performed by Lidia Sawant MD based on my observations and the provider's statements to me.      Veronica Benavides  12/12/2018   [facility] EMERGENCY DEPARTMENT       Lidia Sawant MD  12/12/18 1579

## 2018-12-13 LAB
ANION GAP SERPL CALCULATED.3IONS-SCNC: 5 MMOL/L (ref 3–14)
BUN SERPL-MCNC: 9 MG/DL (ref 7–30)
CALCIUM SERPL-MCNC: 8 MG/DL (ref 8.5–10.1)
CHLORIDE SERPL-SCNC: 110 MMOL/L (ref 94–109)
CO2 SERPL-SCNC: 26 MMOL/L (ref 20–32)
CREAT SERPL-MCNC: 0.55 MG/DL (ref 0.52–1.04)
GFR SERPL CREATININE-BSD FRML MDRD: >90 ML/MIN/1.7M2
GLUCOSE SERPL-MCNC: 93 MG/DL (ref 70–99)
HGB BLD-MCNC: 9.2 G/DL (ref 11.7–15.7)
HGB BLD-MCNC: 9.7 G/DL (ref 11.7–15.7)
HGB BLD-MCNC: 9.8 G/DL (ref 11.7–15.7)
HGB BLD-MCNC: 9.9 G/DL (ref 11.7–15.7)
INTERPRETATION ECG - MUSE: NORMAL
MAGNESIUM SERPL-MCNC: 2.1 MG/DL (ref 1.6–2.3)
POTASSIUM SERPL-SCNC: 3.6 MMOL/L (ref 3.4–5.3)
SODIUM SERPL-SCNC: 141 MMOL/L (ref 133–144)

## 2018-12-13 PROCEDURE — 36415 COLL VENOUS BLD VENIPUNCTURE: CPT | Performed by: INTERNAL MEDICINE

## 2018-12-13 PROCEDURE — 83735 ASSAY OF MAGNESIUM: CPT | Performed by: INTERNAL MEDICINE

## 2018-12-13 PROCEDURE — 12000000 ZZH R&B MED SURG/OB

## 2018-12-13 PROCEDURE — 99232 SBSQ HOSP IP/OBS MODERATE 35: CPT | Performed by: INTERNAL MEDICINE

## 2018-12-13 PROCEDURE — C9113 INJ PANTOPRAZOLE SODIUM, VIA: HCPCS | Performed by: INTERNAL MEDICINE

## 2018-12-13 PROCEDURE — 85018 HEMOGLOBIN: CPT | Performed by: INTERNAL MEDICINE

## 2018-12-13 PROCEDURE — 80048 BASIC METABOLIC PNL TOTAL CA: CPT | Performed by: INTERNAL MEDICINE

## 2018-12-13 PROCEDURE — 25000128 H RX IP 250 OP 636: Performed by: INTERNAL MEDICINE

## 2018-12-13 PROCEDURE — 99207 ZZC CDG-CODE CATEGORY CHANGED: CPT | Performed by: INTERNAL MEDICINE

## 2018-12-13 PROCEDURE — A9270 NON-COVERED ITEM OR SERVICE: HCPCS | Mod: GY | Performed by: STUDENT IN AN ORGANIZED HEALTH CARE EDUCATION/TRAINING PROGRAM

## 2018-12-13 PROCEDURE — 25000132 ZZH RX MED GY IP 250 OP 250 PS 637: Mod: GY | Performed by: STUDENT IN AN ORGANIZED HEALTH CARE EDUCATION/TRAINING PROGRAM

## 2018-12-13 RX ORDER — ACETAMINOPHEN 325 MG/1
650 TABLET ORAL EVERY 6 HOURS PRN
Status: DISCONTINUED | OUTPATIENT
Start: 2018-12-13 | End: 2018-12-14 | Stop reason: HOSPADM

## 2018-12-13 RX ADMIN — SODIUM CHLORIDE: 9 INJECTION, SOLUTION INTRAVENOUS at 19:14

## 2018-12-13 RX ADMIN — SODIUM CHLORIDE 8 MG/HR: 9 INJECTION, SOLUTION INTRAVENOUS at 07:27

## 2018-12-13 RX ADMIN — SODIUM CHLORIDE: 9 INJECTION, SOLUTION INTRAVENOUS at 11:23

## 2018-12-13 RX ADMIN — SODIUM CHLORIDE 8 MG/HR: 9 INJECTION, SOLUTION INTRAVENOUS at 16:37

## 2018-12-13 RX ADMIN — ACETAMINOPHEN 650 MG: 325 TABLET, FILM COATED ORAL at 19:31

## 2018-12-13 ASSESSMENT — ACTIVITIES OF DAILY LIVING (ADL)
ADLS_ACUITY_SCORE: 11
ADLS_ACUITY_SCORE: 11
ADLS_ACUITY_SCORE: 13
ADLS_ACUITY_SCORE: 11

## 2018-12-13 NOTE — PHARMACY-ADMISSION MEDICATION HISTORY
Admission medication history interview status for this patient is complete. See [facility] admission navigator for allergy information, prior to admission medications and immunization status.     Medication history interview source(s):Patient and Family  Medication history resources (including written lists, pill bottles, clinic record):None  Primary pharmacy:[facility]    Changes made to PTA medication list:  Added: -zinc lozenges  Deleted: -  Changed: -    Actions taken by pharmacist (provider contacted, etc):None     Additional medication history information:None    Medication reconciliation/reorder completed by provider prior to medication history? No    Do you take OTC medications (eg tylenol, ibuprofen, fish oil, eye/ear drops, etc)? yes(Y/N)    For patients on insulin therapy: no (Y/N)  Lantus/levemir/NPH/Mix 70/30 dose:   (Y/N) (see Med list for doses)   Sliding scale Novolog Y/N  If Yes, do you have a baseline novolog pre-meal dose:  units with meals  Patients eat three meals a day:   Y/N    How many episodes of hypoglycemia do you have per week: _______  How many missed doses do you have per week: ______  How many times do you check your blood glucose per day: _______  Do you have a Continuous glucose monitor (CGM)   Y/N (remind pt that not approved for hospital use)   Any Barriers to therapy - Be specific :  cost of medications, comfortable with giving injections (if applicable), comfortable and confident with current diabetes regimen: Y/N ______________      Prior to Admission medications    Medication Sig Last Dose Taking? Auth Provider   anastrozole (ARIMIDEX) 1 MG tablet Take 1 mg by mouth daily 12/12/2018 at Unknown time Yes Unknown, Entered By History   calcium-vitamin D (CALTRATE) 600-400 MG-UNIT per tablet Take 1 tablet by mouth daily 12/11/2018 at hs Yes Unknown, Entered By History   Cholecalciferol (VITAMIN D-3) 1000 UNITS CAPS Take 2,000 Units by mouth daily 12/12/2018 at Unknown time Yes Unknown,  Entered By History   citalopram (CELEXA) 20 MG tablet Take 20 mg by mouth daily. 12/12/2018 at Unknown time Yes Reported, Patient   fish oil-omega-3 fatty acids 1000 MG capsule Take 1 g by mouth daily 12/12/2018 at Unknown time Yes Unknown, Entered By History   Glucosamine-Chondroitin 250-200 MG TABS Take 1 tablet by mouth daily 12/11/2018 at hs Yes Unknown, Entered By History   levothyroxine (SSYNTHROID,LEVOTHROID) 100 MCG tablet Take 100 mcg by mouth daily. 12/12/2018 at Unknown time Yes Reported, Patient   melatonin 5 MG tablet Take 5 mg by mouth nightly as needed for sleep  Yes Unknown, Entered By History   pantoprazole (PROTONIX) 40 MG EC tablet Take 1 tablet (40 mg) by mouth daily 12/12/2018 at Unknown time Yes Sam Palomares, DO   simvastatin (ZOCOR) 20 MG tablet Take 40 mg by mouth At Bedtime  12/11/2018 at Unknown time Yes Reported, Patient   zinc 23 MG LOZG lozenge Place 1 lozenge inside cheek daily 12/12/2018 at Unknown time Yes Unknown, Entered By History

## 2018-12-13 NOTE — PROGRESS NOTES
[facility]    Hospitalist Progress Note  Name: Michelle Pedersen    MRN: [id number]  Provider: Amalia Vines MD  Date of Service: 12/13/2018    Assessment & Plan   Summary of Stay: Michelle Pedersen is a 68 year old female who was admitted on 12/12/2018 for acute blood loss anemia with melena.  Patient past medical history significant for hypothyroidism, hyperlipidemia, anxiety, dyspepsia, history of prior duodenal ulcer.  She initially presented to the emergency room with dark stools on 12/5/2018 presented once again with melena and drop in hemoglobin.    Acute blood loss anemia with melena  --Concerning for upper GI bleed  --N.p.o.  --IV fluids  --IV PPI  --Serial hemoglobin check  --GI workup in February 2018: EGD showed erythematous to adenopathy and colonoscopy showed diverticulosis  --GI consulted    Hyperlipidemia  --Holding prior to admission meds as patient is n.p.o.    Hypothyroidism  --We will resume Synthroid once able to take p.o.    History of breast cancer status post partial mastectomy    History of anxiety      DVT Prophylaxis: Pneumatic Compression Devices  Code Status: Full Code    Disposition: Expected discharge in 2-3 days to home if hemoglobin remains stable depending on GI workup.  Pending GI consult      Interval History   Assumed care review chart.  Patient feels better than she was last night.  Continues to have malaise and weakness.  She is n.p.o.  Hemoglobin remained stable.  Vitals are stable.  No chest pain or shortness of breath.    -Data reviewed today: I reviewed all new labs and imaging reports over the last 24 hours. I personally reviewed no images or EKG's today.    Physical Exam   Temp: 98.8  F (37.1  C) Temp src: Oral BP: 131/68 Pulse: 90 Heart Rate: 93 Resp: 16 SpO2: 97 % O2 Device: None (Room air)    Vitals:    12/12/18 1613 12/12/18 2005   Weight: 99.8 kg (220 lb) 99.6 kg (219 lb 9.6 oz)     Vital Signs with Ranges  Temp:  [97.8  F (36.6  C)-98.8  F (37.1  C)] 98.8  F  (37.1  C)  Pulse:  [] 90  Heart Rate:  [] 93  Resp:  [10-24] 16  BP: (129-176)/() 131/68  SpO2:  [96 %-99 %] 97 %  I/O last 3 completed shifts:  In: 1105 [I.V.:1105]  Out: 850 [Urine:850]      GEN:  Alert, oriented x 3, appears comfortable, NAD.  HEENT:  Normocephalic/atraumatic, no scleral icterus, no nasal discharge, mouth moist.  CV:  Regular rate and rhythm, no murmur or JVD.  S1 + S2 noted, no S3 or S4.  LUNGS:  Clear to auscultation bilaterally without rales/rhonchi/wheezing/retractions.  Symmetric chest rise on inhalation noted.  ABD:  Active bowel sounds, soft, non-tender/non-distended.  No rebound/guarding/rigidity.  EXT:  No edema.  No cyanosis.    SKIN:  Dry to touch, no exanthems noted in the visualized areas.    Medications     pantoprazole (PROTONIX) infusion ADULT/PEDS GREATER than or EQUAL to 45 kg 8 mg/hr (12/13/18 0738)     sodium chloride 125 mL/hr at 12/13/18 1123       influenza Vac Split High-Dose  0.5 mL Intramuscular Prior to discharge     Data     Recent Labs   Lab 12/13/18  1048 12/13/18  0607 12/13/18 0141 12/12/18  1656   WBC  --   --   --   --  8.8   HGB 9.9* 9.8* 9.7*   < > 10.5*   HCT  --   --   --   --  32.9*   MCV  --   --   --   --  95   PLT  --   --   --   --  259    < > = values in this interval not displayed.     Recent Labs   Lab 12/13/18  0607 12/12/18  1656    140   POTASSIUM 3.6 3.9   CHLORIDE 110* 106   CO2 26 28   ANIONGAP 5 6   GLC 93 97   BUN 9 12   CR 0.55 0.68   GFRESTIMATED >90 86   GFRESTBLACK >90 >90   VIRI 8.0* 8.4*     No results for input(s): CULT in the last 168 hours.  Recent Labs   Lab 12/13/18  1048 12/13/18  0607 12/13/18  0141   HGB 9.9* 9.8* 9.7*     Recent Labs   Lab 12/12/18  1656   AST 11   ALT 16   ALKPHOS 71   BILITOTAL 0.3     Recent Labs   Lab 12/12/18  1656   INR 1.02     No results for input(s): TSH in the last 168 hours.  No results for input(s): TROPONIN, TROPI, TROPR in the last 168 hours.    Invalid input(s): TROP,  TROPONINIES  No results for input(s): COLOR, APPEARANCE, URINEGLC, URINEBILI, URINEKETONE, SG, UBLD, URINEPH, PROTEIN, UROBILINOGEN, NITRITE, LEUKEST, RBCU, WBCU in the last 168 hours.    No results found for this or any previous visit (from the past 24 hour(s)).

## 2018-12-13 NOTE — PLAN OF CARE
Pt admitted last evening with recurrent rectal bleeding / black, tarry stools, monitoring hemoglobin with Q4H lab - minimal change noted, VSS, denies pain, tele active with 12 second run of PSVT overnight with on-call notified otherwise SR with frequent PVCs per tele tech, SBA for mobility, PIVs with NS 125ml/hr and protonix 8mg/hr, 1 black stool since admission to floor, very anxious about stools and dropping hemoglobin with continued reassurance provided, also stated   on this unit this past summer and brings about some hard memories. GI consulted, expected discharge TBD.

## 2018-12-13 NOTE — H&P
Admitted:     12/12/2018      CHIEF COMPLAINT:  Dark stools.      HISTORY OF PRESENT ILLNESS:  This is a 68-year-old white female with a history of hypothyroidism, hyperlipidemia, anxiety, dyspepsia, prior history of duodenal ulcer in the past, who actually presented to the ER with a dark stool since Sunday.  She was seen in the ER on 12/05/2018 at which point she had nausea, vomiting, and diarrhea.  At that time the stools were not bloody.  She had a CT scan of the abdomen and pelvis which showed no acute changes and she was subsequently discharged.  She felt well until Sunday when she started having dark stools.  Prior to that she was a bit constipated after her prior episode of gastroenteritis had resolved.  She denies any NSAID use.  She denies any abdominal pain.  She denies any lightheadedness, dizziness, chest pain or shortness of breath.  In the ER over here, she was seen by Dr. Lidia Sawant.  I discussed care with her.  I am asked to admit her for further evaluation.      PAST MEDICAL HISTORY:  Significant for depression, hyperlipidemia, hypothyroidism, history of breast cancer status post partial mastectomy, and anxiety.      PAST SURGICAL HISTORY:  Significant for 2 C-sections, appendectomy, cataract surgery, as well as partial mastectomy and breast lumpectomy.      ALLERGIES:  PENICILLIN.      HOME MEDICATION LIST:  Includes:   1.  She is on Arimidex.   2.  Celexa.   4.  Synthroid.   5.  Melatonin.   6.  Protonix.   7.  Zocor.      REVIEW OF SYSTEMS:  As mentioned in the HPI.  Other systems are extensively reviewed and deemed unremarkable and negative.      PHYSICAL EXAMINATION:   VITAL SIGNS:  Here temperature is 97.8, pulse 93, blood pressure is 148/76, respiratory rate 19, O2 sat is 98% on room air.   GENERAL:  The patient is alert, awake, oriented, coherent, accompanied by 2 children, in no acute distress.   HEENT:  Pupils equal, round, react to light.  Pharynx, there is no exudate noted.   LUNGS:   Clear to auscultation bilaterally.   HEART:  Regular rate, S1, S2 normal.  No murmurs or gallops.   ABDOMEN:  Soft, nontender, nondistended, with good bowel sounds.   EXTREMITIES:  There is no edema.   SKIN:  There is no rash.   NEUROLOGIC:  She moves all extremities.      LABORATORY:  Labwork obtained over here shows the following:  CMP is grossly within normal limits other than a calcium of 8.4 and an albumin of 3.1.  Her lactic acid is 0.7.  Her stool occult is noted to be positive.  Her white cell count is 8.8, hemoglobin is 10.5 (it was 14.6 on 2018), platelet count of 259.  Her diff is grossly within normal limits.        An EKG performed over here, which I reviewed, shows normal sinus rhythm at 90 beats per minute with a first-degree AV block.      ASSESSMENT AND PLAN:   1.  Acute blood loss anemia with melena, likely due to upper gastrointestinal bleed.  We will admit her as an inpatient.  We will keep her n.p.o. We will place her on IV fluids.  We will place her on an IV PPI.  We will check her hemoglobin periodically.  Her most recent procedure she had was an upper endoscopy in 2018 when she was admitted for GI bleed.  At that time it showed an erythematous duodenopathy and a colonoscopy showed diverticulosis.  We will consult GI.      CODE STATUS:  Full code.      DISPOSITION:  She will be admitted as an inpatient.         RACHEL OVIEDO MD             D: 2018   T: 2018   MT: MARTÍNEZ      Name:     EMILY BOWENS   MRN:      [id number]        Account:      [id number]   :      1950        Admitted:     2018                   Document: [id number]       cc: Vidya Serna

## 2018-12-13 NOTE — ED NOTES
"[facility]  ED Nurse Handoff Report    Michelle Pedersen is a 68 year old female   ED Chief complaint: Rectal Bleeding  . ED Diagnosis:   Final diagnoses:   Acute GI bleeding   Anemia, unspecified type     Allergies:   Allergies   Allergen Reactions     Penicillins Itching and Rash     Tolerated ceftriaxone         Code Status: not addressed at this visit  Activity level - Baseline/Home:  Independent. Activity Level - Current:   Stand with Assist. Lift room needed: No. Bariatric: No   Needed: No   Isolation: No. Infection: Not Applicable.     Vital Signs:   Vitals:    12/12/18 1655 12/12/18 1700 12/12/18 1705 12/12/18 1710   BP: 154/79 155/78     Pulse:       Resp:  15 19 19   Temp:       SpO2: 98% 97% 97% 98%   Weight:           Cardiac Rhythm:  ,      Pain level:    Patient confused: No. Patient Falls Risk: Yes.   Elimination Status: Has voided   Patient Report - Initial Complaint: Patient presents with complaints of bloody stools which started on Monday. Patient states that stools are dark. She has history of previous GI bleeds in the past. Patients stool was positive for blood and her Hgb was 11.2 at the clinic today. Denies any dizziness or lightheadedness. . ABC intact without need for intervention at this time.    . Focused Assessment: Gastrointestinal - Gastrointestinal Comment:  (Pt has rectal bleeding snce Monday. She has hx of GI bleed and states \"I didn't wait as long this time\". )   Tests Performed: IV, lsbs. Abnormal Results:  Available for lab now.  updated: 12/12/2018 17:09  Occult Blood: Positive [Ref Range: NEG^Negative] FI        17:07 Orders Discontinued Magnesium FI     17:06 CBC with platelets differential Resulted Abnormal Result -   Collected: 12/12/2018 16:56  Last updated: 12/12/2018 17:06  WBC: 8.8 10e9/L [Ref Range: 4.0 - 11.0]  RBC Count: 3.46 10e12/L [Ref Range: 3.8 - 5.2]  Hemoglobin: 10.5 g/dL [Ref Range: 11.7 - 15.7]  Hematocrit: 32.9 % [Ref Range: 35.0 - " 47.0]  MCV: 95 fl [Ref Range: 78 - 100]  MCH: 30.3 pg [Ref Range: 26.5 - 33.0]  MCHC: 31.9 g/dL [Ref Range: 31.5 - 36.5]  RDW: 14.9 % [Ref Range: 10.0 - 15.0]  Platelet Count: 259 10e9/L [Ref Range: 150 - 450]  Diff Method: Automated Method  % Neutrophils: 70.7 %  % Lymphocytes: 15.8 %  % Monocytes: 6.7 %  % Eosinophils: 0.8 %  % Basophils: 0.7 %  % Immature Granulocytes: 4.3 %  Nucleated RBCs: 1 /100 [Ref Range: 0]  Absolute Neutrophil: 6.3 10e9/L [Ref Range: 1.6 - 8.3]  Absolute Lymphocytes: 1.4 10e9/L [Ref Range: 0.8 - 5.3]  Absolute Monocytes: 0.6 10e9/L [Ref Range: 0.0 - 1.3]  Absolute Eosinophils: 0.1 10e9/L [Ref Range: 0.0 - 0.7]  Absolute Basophils: 0.1 10e9/L [Ref Range: 0.0 - 0.2]  Abs Immature Granulocytes: 0.       whole blood FI        17:22 Magnesium Resulted Collected: 12/12/2018 16:56  Last updated: 12/12/2018 17:22  Magnesium: 2.0 mg/dL [Ref Range: 1.6 - 2.3] FI     17:22 Comprehensive metabolic panel Resulted Abnormal Result -   Collected: 12/12/2018 16:56  Last updated: 12/12/2018 17:22  Sodium: 140 mmol/L [Ref Range: 133 - 144]  Potassium: 3.9 mmol/L [Ref Range: 3.4 - 5.3]  Chloride: 106 mmol/L [Ref Range: 94 - 109]  Carbon Dioxide: 28 mmol/L [Ref Range: 20 - 32]  Anion Gap: 6 mmol/L [Ref Range: 3 - 14]  Glucose: 97 mg/dL [Ref Range: 70 - 99]  Urea Nitrogen: 12 mg/dL [Ref Range: 7 - 30]  Creatinine: 0.68 mg/dL [Ref Range: 0.52 - 1.04]  GFR Estimate: 86 mL/min/1.7m2 [Ref Range: >60] (Non  GFR Calc)  GFR Estimate If Black: >90 mL/min/1.7m2 [Ref Range: >60] ( GFR Calc)  Calcium: 8.4 mg/dL [Ref Range: 8.5 - 10.1]  Bilirubin Total: 0.3 mg/dL [Ref Range: 0.2 - 1.3]  Albumin: 3.1 g/dL [Ref Range: 3.4 - 5.0]  Protein Total: 6.8 g/dL [Ref Range: 6.8 - 8.8]  Alkaline Phosphatase: 71 U/L [Ref Range: 40 - 150]  ALT: 16 U/L [Ref Range: 0 - 50]  AST: 11 U/L [Ref Range: 0 - 45]             Treatments provided: vital signs, protonix  Family Comments: son is at bedside  OBS  brochure/video discussed/provided to patient:  N/A  ED Medications:   Medications   pantoprazole (PROTONIX) 40 mg IV push injection (40 mg Intravenous Given 12/12/18 0864)     Drips infusing:  No  For the majority of the shift, the patient's behavior Green. Interventions performed were routine care.     Severe Sepsis OR Septic Shock Diagnosis Present: No      ED Nurse Name/Phone Number: Maria Guadalupe Hansen,   6:18 PM    RECEIVING UNIT ED HANDOFF REVIEW    Above ED Nurse Handoff Report was reviewed: Yes  Reviewed by: Ngoc Marcos on December 12, 2018 at 7:41 PM

## 2018-12-13 NOTE — PROGRESS NOTES
Cross cover:    Notified about short run of PSVT while in route to the bathroom  Ordered check electrolytes and magnesium.

## 2018-12-14 ENCOUNTER — APPOINTMENT (OUTPATIENT)
Dept: ULTRASOUND IMAGING | Facility: CLINIC | Age: 68
DRG: 378 | End: 2018-12-14
Attending: INTERNAL MEDICINE
Payer: MEDICARE

## 2018-12-14 VITALS
OXYGEN SATURATION: 97 % | DIASTOLIC BLOOD PRESSURE: 74 MMHG | RESPIRATION RATE: 17 BRPM | HEART RATE: 92 BPM | SYSTOLIC BLOOD PRESSURE: 156 MMHG | TEMPERATURE: 98.1 F | BODY MASS INDEX: 41.69 KG/M2 | WEIGHT: 220.8 LBS | HEIGHT: 61 IN

## 2018-12-14 LAB
ANION GAP SERPL CALCULATED.3IONS-SCNC: 5 MMOL/L (ref 3–14)
BASOPHILS # BLD AUTO: 0 10E9/L (ref 0–0.2)
BASOPHILS NFR BLD AUTO: 0 %
BUN SERPL-MCNC: 6 MG/DL (ref 7–30)
CALCIUM SERPL-MCNC: 8.3 MG/DL (ref 8.5–10.1)
CHLORIDE SERPL-SCNC: 110 MMOL/L (ref 94–109)
CO2 SERPL-SCNC: 25 MMOL/L (ref 20–32)
CREAT SERPL-MCNC: 0.56 MG/DL (ref 0.52–1.04)
DIFFERENTIAL METHOD BLD: ABNORMAL
EOSINOPHIL # BLD AUTO: 0 10E9/L (ref 0–0.7)
EOSINOPHIL NFR BLD AUTO: 0 %
ERYTHROCYTE [DISTWIDTH] IN BLOOD BY AUTOMATED COUNT: 15 % (ref 10–15)
GFR SERPL CREATININE-BSD FRML MDRD: >90 ML/MIN/1.7M2
GLUCOSE SERPL-MCNC: 81 MG/DL (ref 70–99)
HCT VFR BLD AUTO: 30 % (ref 35–47)
HGB BLD-MCNC: 9.5 G/DL (ref 11.7–15.7)
LYMPHOCYTES # BLD AUTO: 0.7 10E9/L (ref 0.8–5.3)
LYMPHOCYTES NFR BLD AUTO: 10 %
MCH RBC QN AUTO: 30.4 PG (ref 26.5–33)
MCHC RBC AUTO-ENTMCNC: 31.7 G/DL (ref 31.5–36.5)
MCV RBC AUTO: 96 FL (ref 78–100)
METAMYELOCYTES # BLD: 0.1 10E9/L
METAMYELOCYTES NFR BLD MANUAL: 2 %
MONOCYTES # BLD AUTO: 0.3 10E9/L (ref 0–1.3)
MONOCYTES NFR BLD AUTO: 4 %
NEUTROPHILS # BLD AUTO: 5.5 10E9/L (ref 1.6–8.3)
NEUTROPHILS NFR BLD AUTO: 84 %
PLATELET # BLD AUTO: 224 10E9/L (ref 150–450)
PLATELET # BLD EST: ABNORMAL 10*3/UL
POTASSIUM SERPL-SCNC: 4.5 MMOL/L (ref 3.4–5.3)
RBC # BLD AUTO: 3.13 10E12/L (ref 3.8–5.2)
RBC MORPH BLD: ABNORMAL
SODIUM SERPL-SCNC: 140 MMOL/L (ref 133–144)
UPPER GI ENDOSCOPY: NORMAL
WBC # BLD AUTO: 6.5 10E9/L (ref 4–11)

## 2018-12-14 PROCEDURE — 99239 HOSP IP/OBS DSCHRG MGMT >30: CPT | Performed by: INTERNAL MEDICINE

## 2018-12-14 PROCEDURE — 0DJ08ZZ INSPECTION OF UPPER INTESTINAL TRACT, VIA NATURAL OR ARTIFICIAL OPENING ENDOSCOPIC: ICD-10-PCS | Performed by: INTERNAL MEDICINE

## 2018-12-14 PROCEDURE — 85025 COMPLETE CBC W/AUTO DIFF WBC: CPT | Performed by: INTERNAL MEDICINE

## 2018-12-14 PROCEDURE — C9113 INJ PANTOPRAZOLE SODIUM, VIA: HCPCS | Performed by: INTERNAL MEDICINE

## 2018-12-14 PROCEDURE — 25000125 ZZHC RX 250: Performed by: INTERNAL MEDICINE

## 2018-12-14 PROCEDURE — 93975 VASCULAR STUDY: CPT | Mod: TC

## 2018-12-14 PROCEDURE — 43235 EGD DIAGNOSTIC BRUSH WASH: CPT | Performed by: INTERNAL MEDICINE

## 2018-12-14 PROCEDURE — 25000128 H RX IP 250 OP 636: Performed by: INTERNAL MEDICINE

## 2018-12-14 PROCEDURE — 90662 IIV NO PRSV INCREASED AG IM: CPT | Performed by: INTERNAL MEDICINE

## 2018-12-14 PROCEDURE — G0500 MOD SEDAT ENDO SERVICE >5YRS: HCPCS | Performed by: INTERNAL MEDICINE

## 2018-12-14 PROCEDURE — 36415 COLL VENOUS BLD VENIPUNCTURE: CPT | Performed by: INTERNAL MEDICINE

## 2018-12-14 PROCEDURE — 80048 BASIC METABOLIC PNL TOTAL CA: CPT | Performed by: INTERNAL MEDICINE

## 2018-12-14 RX ORDER — FLUMAZENIL 0.1 MG/ML
0.2 INJECTION, SOLUTION INTRAVENOUS
Status: CANCELLED | OUTPATIENT
Start: 2018-12-14 | End: 2018-12-15

## 2018-12-14 RX ORDER — LIDOCAINE 40 MG/G
CREAM TOPICAL
Status: DISCONTINUED | OUTPATIENT
Start: 2018-12-14 | End: 2018-12-14 | Stop reason: HOSPADM

## 2018-12-14 RX ORDER — FENTANYL CITRATE 50 UG/ML
INJECTION, SOLUTION INTRAMUSCULAR; INTRAVENOUS PRN
Status: DISCONTINUED | OUTPATIENT
Start: 2018-12-14 | End: 2018-12-14 | Stop reason: HOSPADM

## 2018-12-14 RX ORDER — ONDANSETRON 4 MG/1
4 TABLET, ORALLY DISINTEGRATING ORAL EVERY 6 HOURS PRN
Status: CANCELLED | OUTPATIENT
Start: 2018-12-14

## 2018-12-14 RX ORDER — NALOXONE HYDROCHLORIDE 0.4 MG/ML
.1-.4 INJECTION, SOLUTION INTRAMUSCULAR; INTRAVENOUS; SUBCUTANEOUS
Status: CANCELLED | OUTPATIENT
Start: 2018-12-14 | End: 2018-12-15

## 2018-12-14 RX ORDER — ONDANSETRON 2 MG/ML
4 INJECTION INTRAMUSCULAR; INTRAVENOUS EVERY 6 HOURS PRN
Status: CANCELLED | OUTPATIENT
Start: 2018-12-14

## 2018-12-14 RX ADMIN — SODIUM CHLORIDE: 9 INJECTION, SOLUTION INTRAVENOUS at 02:10

## 2018-12-14 RX ADMIN — SODIUM CHLORIDE 8 MG/HR: 9 INJECTION, SOLUTION INTRAVENOUS at 15:07

## 2018-12-14 RX ADMIN — SODIUM CHLORIDE: 9 INJECTION, SOLUTION INTRAVENOUS at 10:20

## 2018-12-14 RX ADMIN — SODIUM CHLORIDE 8 MG/HR: 9 INJECTION, SOLUTION INTRAVENOUS at 02:07

## 2018-12-14 RX ADMIN — INFLUENZA A VIRUS A/MICHIGAN/45/2015 X-275 (H1N1) ANTIGEN (FORMALDEHYDE INACTIVATED), INFLUENZA A VIRUS A/SINGAPORE/INFIMH-16-0019/2016 IVR-186 (H3N2) ANTIGEN (FORMALDEHYDE INACTIVATED), AND INFLUENZA B VIRUS B/MARYLAND/15/2016 BX-69A (A B/COLORADO/6/2017-LIKE VIRUS) ANTIGEN (FORMALDEHYDE INACTIVATED) 0.5 ML: 60; 60; 60 INJECTION, SUSPENSION INTRAMUSCULAR at 15:55

## 2018-12-14 ASSESSMENT — ACTIVITIES OF DAILY LIVING (ADL)
ADLS_ACUITY_SCORE: 11

## 2018-12-14 ASSESSMENT — MIFFLIN-ST. JEOR: SCORE: 1458.92

## 2018-12-14 NOTE — OR NURSING
Pt tolerated egd in endo well. Pt to have liver US with dopplers hopefully yet today. To stay NPO for now. Will keep pt in endo for 30min to observe than transfer back to station. Report called to station nurse.

## 2018-12-14 NOTE — PROGRESS NOTES
Pt to D/C to home.  Pt provided with d/c instructions, including new medications, when medications were last given, and when to take them again.  Pt also informed to f/u with PCP in 7 days.  Pt verbalized understanding of all d/c and f/u instructions.  All questions were answered at this time.  Copy of paperwork sent with pt. Daughter to provide transport.  All personal belongings sent with pt.     Flu Vaccine: Given

## 2018-12-14 NOTE — DISCHARGE SUMMARY
[facility]  Discharge Summary  Hospitalist      Date of Admission:  12/12/2018  Date of Discharge:  12/14/2018  Provider:  Amalia Vines MD  Date of Service (when I last saw the patient): 12/14/18      Primary Provider: Vidya Serna          Discharge Diagnosis:   Discharge Diagnoses   Acute blood loss anemia with melena  Hyperlipidemia  Hypothyroidism.       Other medical issues:  Past Medical History:   Diagnosis Date     Breast cancer (H)      Depression      High cholesterol      Hypothyroid           History of Present Illness   Michelle Pedersen is an 68 year old female who presented with dark colored stools..  Please see the admission history and physical for full details.    Hospital Course     Michelle Pedersen was admitted on 12/12/2018.  She is a 68-year-old female with past medical history significant for hypothyroidism, hyperlipidemia, anxiety, dyspepsia and history of duodenal ulcer who was admitted for acute blood loss anemia and melena noted.  She presented with drop in hemoglobin and history of black colored stools.  Underwent upper GI endoscopy which showed a questionable varix at GJ junction.  Further evaluation including ultrasound showed no evidence of portal hypertension.  Hemoglobin remained stable.  Vitals were stable prior to discharge.  Patient will need outpatient follow-up with gastroenterology.      The following problems were addressed during her hospitalization:    Acute blood loss anemia with melena  --Concerning for upper GI bleed  --Status post EGD 12/14/2018  --Questionable varix noted at GE junction  --s/p ultrasounds to rule out portal hypertension  --Initially treated with IV PPI.  --Recent GI workup in February 2018: EGD showed erythematous to adenopathy and colonoscopy showed diverticulosis        Hyperlipidemia  --Prior to admission statins resumed on discharge     Hypothyroidism  --Synthroid resumed on discharge        Significant Results and Procedures   As  noted    Pending Results   Unresulted Labs Ordered in the Past 30 Days of this Admission     No orders found from 10/13/2018 to 12/13/2018.          Code Status   Full Code       Primary Care Physician   Vidya Serna    Physical Exam   Temp: 98.1  F (36.7  C) Temp src: Oral BP: 156/74 Pulse: 92 Heart Rate: 97 Resp: 17 SpO2: 97 % O2 Device: None (Room air) Oxygen Delivery: 2 LPM  Vitals:    12/12/18 1613 12/12/18 2005 12/14/18 0500   Weight: 99.8 kg (220 lb) 99.6 kg (219 lb 9.6 oz) 100.2 kg (220 lb 12.8 oz)     Vital Signs with Ranges  Temp:  [97.1  F (36.2  C)-98.3  F (36.8  C)] 98.1  F (36.7  C)  Pulse:  [] 92  Heart Rate:  [] 97  Resp:  [13-24] 17  BP: (125-173)/(62-85) 156/74  SpO2:  [94 %-99 %] 97 %  I/O last 3 completed shifts:  In: 2173 [I.V.:2173]  Out: 3775 [Urine:3775]    Constitutional: Awake, alert, cooperative, no apparent distress, and appears stated age.  Respiratory: No increased work of breathing, good air exchange, clear to auscultation bilaterally, no crackles or wheezing.  Cardiovascular: Normal apical impulse,normal S1 and S2,   GI: Normal bowel sounds, soft, non-distended      Discharge Disposition   Discharged to home    Consultations This Hospital Stay   GASTROENTEROLOGY IP CONSULT  GASTROENTEROLOGY IP CONSULT    Time Spent on this Encounter   IAmalia, personally saw the patient today and spent greater than 30 minutes discharging this patient.    Discharge Orders      Reason for your hospital stay    Please refer to discharge summary. Briefly admitted for melena. Anemia     Follow-up and recommended labs and tests     Follow up with primary care provider, Vidya Serna, within 7 days for hospital follow- up.  The following labs/tests are recommended: cbc    Follow up with [facility] as planned in 3 months  Please call or come if you have any new or worsening symptoms.     Activity    Your activity upon discharge: activity as tolerated     Full Code     Diet    Follow this  diet upon discharge: advance diet as tolerated     Discharge Medications   Current Discharge Medication List      CONTINUE these medications which have NOT CHANGED    Details   anastrozole (ARIMIDEX) 1 MG tablet Take 1 mg by mouth daily      calcium-vitamin D (CALTRATE) 600-400 MG-UNIT per tablet Take 1 tablet by mouth daily      Cholecalciferol (VITAMIN D-3) 1000 UNITS CAPS Take 2,000 Units by mouth daily      citalopram (CELEXA) 20 MG tablet Take 20 mg by mouth daily.      fish oil-omega-3 fatty acids 1000 MG capsule Take 1 g by mouth daily      Glucosamine-Chondroitin 250-200 MG TABS Take 1 tablet by mouth daily      levothyroxine (SSYNTHROID,LEVOTHROID) 100 MCG tablet Take 100 mcg by mouth daily.      melatonin 5 MG tablet Take 5 mg by mouth nightly as needed for sleep      pantoprazole (PROTONIX) 40 MG EC tablet Take 1 tablet (40 mg) by mouth daily  Qty: 30 tablet, Refills: 0    Associated Diagnoses: Gastrointestinal hemorrhage, unspecified gastrointestinal hemorrhage type      simvastatin (ZOCOR) 20 MG tablet Take 40 mg by mouth At Bedtime       zinc 23 MG LOZG lozenge Place 1 lozenge inside cheek daily           Allergies   Allergies   Allergen Reactions     Penicillins Itching and Rash     Tolerated ceftriaxone       Data   Most Recent 3 CBC's:  Recent Labs   Lab Test 12/14/18  0625 12/13/18  2143 12/13/18  1048  12/12/18  1656 12/05/18  1325   WBC 6.5  --   --   --  8.8 8.7   HGB 9.5* 9.2* 9.9*   < > 10.5* 14.6   MCV 96  --   --   --  95 94     --   --   --  259 191    < > = values in this interval not displayed.      Most Recent 3 BMP's:  Recent Labs   Lab Test 12/14/18  0625 12/13/18  0607 12/12/18  1656    141 140   POTASSIUM 4.5 3.6 3.9   CHLORIDE 110* 110* 106   CO2 25 26 28   BUN 6* 9 12   CR 0.56 0.55 0.68   ANIONGAP 5 5 6   VIRI 8.3* 8.0* 8.4*   GLC 81 93 97     Most Recent 2 LFT's:  Recent Labs   Lab Test 12/12/18  1656 12/05/18  1325   AST 11 13   ALT 16 17   ALKPHOS 71 85   BILITOTAL  0.3 0.4     Most Recent INR's and Anticoagulation Dosing History:  Anticoagulation Dose History     Recent Dosing and Labs Latest Ref Rng & Units 10/26/2011 2/23/2018 12/12/2018    INR 0.86 - 1.14 1.11 1.25(H) 1.02        Most Recent 3 Troponin's:  Recent Labs   Lab Test 10/26/11  1750   TROPI 0.018     Most Recent Cholesterol Panel:No lab results found.  Most Recent 6 Bacteria Isolates From Any Culture (See EPIC Reports for Culture Details):  Recent Labs   Lab Test 02/20/18  1211 02/20/18  1155   CULT No growth No growth     Most Recent TSH, T4 and A1c Labs:No lab results found.  Results for orders placed or performed during the hospital encounter of 12/12/18   US Abdomen Limited w Abd/Pelvis Duplex Complete    Narrative    US ABDOMEN LIMITED WITH DOPPLER COMPLETE  12/14/2018  3:12 PM     HISTORY: Evaluate if patient has elevated portal pressures/portal  hypertension.    FINDINGS: The gallbladder is unremarkable. No focal hepatic lesion or  common bile duct dilatation. The pancreas is obscured. The right  kidney is unremarkable. Doppler waveform analysis shows normal  direction of blood flow within the portal and splenic veins, as well  as patency of the hepatic veins and hepatic arteries.      Impression    IMPRESSION: No acute pathology identified.           Disclaimer: This note consists of symbols derived from keyboarding, dictation and/or voice recognition software. As a result, there may be errors in the script that have gone undetected. Please consider this when interpreting information found in this chart.

## 2018-12-14 NOTE — PROCEDURES
PRE-PROCEDURE H&P    CHIEF COMPLAINT / REASON FOR PROCEDURE:  melena    PERTINENT HISTORY :    Past Medical History:   Diagnosis Date     Breast cancer (H)      Depression      High cholesterol      Hypothyroid       Past Surgical History:   Procedure Laterality Date     COLONOSCOPY N/A 2/26/2018    Procedure: COLONOSCOPY;  COLONOSCOPY  Rm 509;  Surgeon: Nadia York MD;  Location:  GI     ESOPHAGOSCOPY, GASTROSCOPY, DUODENOSCOPY (EGD), COMBINED  10/27/2011    Procedure:COMBINED ESOPHAGOSCOPY, GASTROSCOPY, DUODENOSCOPY (EGD), BIOPSY SINGLE OR MULTIPLE; Esophagoscopy, Gastroscopy, Duodenoscopy cold biopsy ; Surgeon:NADIA MORALES; Location:[facility]     ESOPHAGOSCOPY, GASTROSCOPY, DUODENOSCOPY (EGD), COMBINED N/A 2/21/2018    Procedure: COMBINED ESOPHAGOSCOPY, GASTROSCOPY, DUODENOSCOPY (EGD), BIOPSY SINGLE OR MULTIPLE;  ESOPHAGOSCOPY, GASTROSCOPY, DUODENOSCOPY with cold biospy;  Surgeon: Nadia York MD;  Location:  GI         Bleeding tendencies:  No    Relevant Family History:  NONE     Relevant Social History:  NONE      A relevant review of systems was performed and was negative    ALLERGIES/SENSITIVITIES:   Allergies   Allergen Reactions     Penicillins Itching and Rash     Tolerated ceftriaxone         CURRENT MEDICATIONS:   No current outpatient medications on file.        PRE-SEDATION ASSESSMENT:    Lung Exam:  normal  Heart Exam:  normal  Airway Exam: normal  Previous reaction to anesthesia/sedation:   No  Sedation plan based on assessment: Moderate (conscious) sedation  ASA Classification:  2 - Mild systemic disease      IMPRESSION:  melena    PLAN:  EGD     Ann Burleson  [facility]  Office: [phone]

## 2018-12-14 NOTE — PROGRESS NOTES
[facility]    Hospitalist Progress Note  Name: Michelle Pedersen    MRN: [id number]  Provider: Amalia Vines MD  Date of Service: 12/14/2018    Assessment & Plan   Summary of Stay: Michelle Pedersen is a 68 year old female who was admitted on 12/12/2018 for acute blood loss anemia with melena.  Patient past medical history significant for hypothyroidism, hyperlipidemia, anxiety, dyspepsia, history of prior duodenal ulcer.  She initially presented to the emergency room with dark stools on 12/5/2018 presented once again with melena and drop in hemoglobin.    Acute blood loss anemia with melena  --Concerning for upper GI bleed  --Status post EGD 12/14/2018  --Questionable varix noted at GE junction  --N.p.o. for now until ultrasounds are completed as recommended by GI  --IV PPI for now  --GI workup in February 2018: EGD showed erythematous to adenopathy and colonoscopy showed diverticulosis      Hyperlipidemia  --Holding prior to admission meds as patient is n.p.o.    Hypothyroidism  --We will resume Synthroid once able to take p.o.    History of breast cancer status post partial mastectomy    History of anxiety    Addendum  --Care plan discussed with Dr. Burleson  --We will get ultrasound to rule out portal hypertension    DVT Prophylaxis: Pneumatic Compression Devices  Code Status: Full Code    Disposition: Expected discharge in 1-2 days to home if hemoglobin remains stable and after completion of evaluation    Interval History   Review chart.  Patient denies chest pain shortness of breath lightheadedness or dizziness.  No more black stools today.  Awaiting endoscopy. no chest pain or shortness of breath.  Review of all the other symptoms are negative    -Data reviewed today: I reviewed all new labs and imaging reports over the last 24 hours. I personally reviewed no images or EKG's today.    Physical Exam   Temp: 97.1  F (36.2  C) Temp src: Oral BP: 131/73 Pulse: 92 Heart Rate: 104 Resp: 20 SpO2: 98 % O2  Device: None (Room air)    Vitals:    12/12/18 1613 12/12/18 2005 12/14/18 0500   Weight: 99.8 kg (220 lb) 99.6 kg (219 lb 9.6 oz) 100.2 kg (220 lb 12.8 oz)     Vital Signs with Ranges  Temp:  [97.1  F (36.2  C)-98.8  F (37.1  C)] 97.1  F (36.2  C)  Pulse:  [] 92  Heart Rate:  [] 104  Resp:  [16-20] 20  BP: (131-173)/(68-85) 131/73  SpO2:  [97 %-99 %] 98 %  I/O last 3 completed shifts:  In: 2111.2 [I.V.:2111.2]  Out: 3025 [Urine:3025]      GEN:  Alert, oriented x 3, appears comfortable, NAD.  HEENT:  Normocephalic/atraumatic, no scleral icterus, no nasal discharge, mouth moist.  CV:  Regular rate and rhythm, no murmur or JVD.  S1 + S2 noted, no S3 or S4.  LUNGS:  Clear to auscultation bilaterally without rales/rhonchi/wheezing/retractions.  Symmetric chest rise on inhalation noted.  ABD:  Active bowel sounds, soft, non-tender/non-distended.  No rebound/guarding/rigidity.  EXT:  No edema.  No cyanosis.    SKIN:  Dry to touch, no exanthems noted in the visualized areas.    Medications     pantoprazole (PROTONIX) infusion ADULT/PEDS GREATER than or EQUAL to 45 kg 8 mg/hr (12/14/18 0207)     sodium chloride 125 mL/hr at 12/14/18 0210       influenza Vac Split High-Dose  0.5 mL Intramuscular Prior to discharge     Data     Recent Labs   Lab 12/14/18  0625 12/13/18  2143 12/13/18  1048  12/12/18  1656   WBC 6.5  --   --   --  8.8   HGB 9.5* 9.2* 9.9*   < > 10.5*   HCT 30.0*  --   --   --  32.9*   MCV 96  --   --   --  95     --   --   --  259    < > = values in this interval not displayed.     Recent Labs   Lab 12/14/18  0625 12/13/18  0607 12/12/18  1656    141 140   POTASSIUM 4.5 3.6 3.9   CHLORIDE 110* 110* 106   CO2 25 26 28   ANIONGAP 5 5 6   GLC 81 93 97   BUN 6* 9 12   CR 0.56 0.55 0.68   GFRESTIMATED >90 >90 86   GFRESTBLACK >90 >90 >90   VIRI 8.3* 8.0* 8.4*     No results for input(s): CULT in the last 168 hours.  Recent Labs   Lab 12/14/18  0625 12/13/18  2143 12/13/18  1048   HGB 9.5*  9.2* 9.9*     Recent Labs   Lab 12/12/18  1656   AST 11   ALT 16   ALKPHOS 71   BILITOTAL 0.3     Recent Labs   Lab 12/12/18  1656   INR 1.02     No results for input(s): TSH in the last 168 hours.  No results for input(s): TROPONIN, TROPI, TROPR in the last 168 hours.    Invalid input(s): TROP, TROPONINIES  No results for input(s): COLOR, APPEARANCE, URINEGLC, URINEBILI, URINEKETONE, SG, UBLD, URINEPH, PROTEIN, UROBILINOGEN, NITRITE, LEUKEST, RBCU, WBCU in the last 168 hours.    No results found for this or any previous visit (from the past 24 hour(s)).

## 2018-12-14 NOTE — CONSULTS
GASTROENTEROLOGY CONSULTATION      Michelle Pedersen  [address] [id number]  68 year old female     Admission Date/Time: 12/12/2018  Primary Care Provider: Vidya Serna  Referring / Attending Physician:  Dr. Rosenberg     We were asked to see the patient in consultation by Dr. Rosenberg for evaluation of melena.        HPI:  Michelle Pedersen is a 68 year old female with with medical history of hypothyroidism, hyperlipidemia, obesity, and history of duodenal ulcer who presents to the emergency room with dark black colored stool.    Patient is a good historian.  She reports that December she was experiencing nausea vomiting and.  She reports watery brown stool.  CT scan was completed of the abdomen and pelvis and this was normal.  However a few days later she began noticing dark black stool.  Initially it was a larger volume and watery black.  She estimates approximately 1-2 dark stools prior to admission.  She has no abdominal pain denies any red blood in her stool.  No fevers or chills.  She has been eating and drinking normally.  She does not use any NSAIDs and is quite careful to avoid these.  She does not use any antiplatelet or anticoagulation.  She does take pantoprazole.  She does not use any oral iron, but was started on this in February until her hemoglobin returned to normal.    Patient had similar symptoms in February 2018.  This led to an EGD and colonoscopy by Dr. York.  EGD showed normal esophagus with some erythematous mucosa in the greater curvature of the stomach.  There is also erythematous to adenopathy.  Biopsies of the stomach were entirely normal and negative for H. pylori.  Colonoscopy revealing diverticulosis in the ascending colon but otherwise normal.  In 2011 the patient did have multiple duodenal ulcers on upper endoscopy after presenting with melena.       PAST MEDICAL HISTORY:  Patient Active Problem List    Diagnosis Date Noted     Melena 12/12/2018     Priority: Medium      GI bleed 02/20/2018     Priority: Medium          ROS: A comprehensive ten point review of systems was negative aside from those in mentioned in the HPI.       MEDICATIONS:   Prior to Admission medications    Medication Sig Start Date End Date Taking? Authorizing Provider   anastrozole (ARIMIDEX) 1 MG tablet Take 1 mg by mouth daily   Yes Unknown, Entered By History   calcium-vitamin D (CALTRATE) 600-400 MG-UNIT per tablet Take 1 tablet by mouth daily   Yes Unknown, Entered By History   Cholecalciferol (VITAMIN D-3) 1000 UNITS CAPS Take 2,000 Units by mouth daily   Yes Unknown, Entered By History   citalopram (CELEXA) 20 MG tablet Take 20 mg by mouth daily.   Yes Reported, Patient   fish oil-omega-3 fatty acids 1000 MG capsule Take 1 g by mouth daily   Yes Unknown, Entered By History   Glucosamine-Chondroitin 250-200 MG TABS Take 1 tablet by mouth daily   Yes Unknown, Entered By History   levothyroxine (SSYNTHROID,LEVOTHROID) 100 MCG tablet Take 100 mcg by mouth daily.   Yes Reported, Patient   melatonin 5 MG tablet Take 5 mg by mouth nightly as needed for sleep   Yes Unknown, Entered By History   pantoprazole (PROTONIX) 40 MG EC tablet Take 1 tablet (40 mg) by mouth daily 2/28/18  Yes Sam Palomares DO   simvastatin (ZOCOR) 20 MG tablet Take 40 mg by mouth At Bedtime    Yes Reported, Patient   zinc 23 MG LOZG lozenge Place 1 lozenge inside cheek daily   Yes Unknown, Entered By History        ALLERGIES:   Allergies   Allergen Reactions     Penicillins Itching and Rash     Tolerated ceftriaxone          SOCIAL HISTORY:  Social History     Tobacco Use     Smoking status: Never Smoker     Smokeless tobacco: Never Used   Substance Use Topics     Alcohol use: Yes     Comment: rarely     Drug use: No        FAMILY HISTORY:  Family History   Problem Relation Age of Onset     Colon Cancer Brother         PHYSICAL EXAM:     /73 (BP Location: Right arm)   Pulse 92   Temp 97.1  F (36.2  C) (Oral)   Resp 20   Ht  "1.549 m (5' 1\")   Wt 100.2 kg (220 lb 12.8 oz)   SpO2 98%   BMI 41.72 kg/m       PHYSICAL EXAM:  GENERAL: No distress, resting comfortably  SKIN: no suspicious lesions, rashes  HEAD: Normocephalic. Atraumatic.  NECK: Neck supple. No adenopathy.   EYES: No scleral icterus  RESPIRATORY: Good transmission. CTA bilaterally.   CARDIOVASCULAR: RRR, normal S1, S2,  No murmur appreciated  GASTROINTESTINAL: +BS, soft, non tender, non distended, no guarding/rebound  JOINT/EXTREMITIES:  no gross deformities noted, normal muscle tone  NEURO: CN 2-12 grossly intact, no focal deficits  PSYCH: Normal affect          ADDITIONAL COMMENTS:   I reviewed the patient's new clinical lab test results.   Recent Labs   Lab Test 12/14/18  0625 12/13/18  2143 12/13/18  1048  12/12/18  1656 12/05/18  1325  02/23/18  0823  10/26/11  1750   WBC 6.5  --   --   --  8.8 8.7  --   --    < > 14.5*   HGB 9.5* 9.2* 9.9*   < > 10.5* 14.6   < > 6.9*   < > 11.8   MCV 96  --   --   --  95 94  --   --    < > 91     --   --   --  259 191  --  154   < > 212   INR  --   --   --   --  1.02  --   --  1.25*  --  1.11    < > = values in this interval not displayed.     Recent Labs   Lab Test 12/14/18  0625 12/13/18  0607 12/12/18  1656   POTASSIUM 4.5 3.6 3.9   CHLORIDE 110* 110* 106   CO2 25 26 28   BUN 6* 9 12   ANIONGAP 5 5 6     Recent Labs   Lab Test 12/12/18  1656 12/05/18  1325 02/20/18  1409 02/20/18  1116 10/26/11  1750   ALBUMIN 3.1* 3.3*  --  3.4 3.3   BILITOTAL 0.3 0.4  --  0.4 0.4   ALT 16 17  --  19 25   AST 11 13  --  13 17   PROTEIN  --   --  Negative  --   --    LIPASE  --  89  --   --  95          CONSULTATION ASSESSMENT AND PLAN:    Michelle Pedersen is a 68 year old with with medical history of hypothyroidism, hyperlipidemia, obesity, and history of duodenal ulcer who presents to the emergency room with dark black colored stool and worsening anemia.    1.  Melena: Patient has noticed dark black stool for the past 4-5 days.  Most recent " bowel movement was this morning which was black in color.  Hemoglobin on 12/5 was 14 today her hemoglobin is 9.5.  She does use a PPI aspirin daily.  She does seem to be improving with IV PPI here in the hospital.  She does not use any NSAIDs.  Normal colonoscopy February 2018.    -- We will plan for upper endoscopy this afternoon with Dr. Burleson  -- NPO.       I discussed the patient plan with Dr. Burleson. Thank you for asking us to participate in the care of this patient.    Rhonda Andres PA-C  [facility]    ---------------------------  I agree with the assessment and plan of Rhonda Andres.  Patient denies abd pain.  Had n/v prior to melena, no hematemesis.  Last BM was yesterday and small black stool, none today.  abd is benign.  EGD now, evaluate for malorry alejandra tear, PUD, vs other    Nice Ann Burleson MD  [facility]

## 2018-12-14 NOTE — PLAN OF CARE
Assumed care at 0300, pt sleeping on and off due to increased voiding, denies pain, NS 125ml/hr, protonix gtt 8mg/hr, SBA for mobility, TELE active, NPO except ice / meds. Expected to be seen by GI this AM and further care plan to be discussed. No stools overnight.

## 2018-12-14 NOTE — PLAN OF CARE
Pt has one black formed stool this shift. Slight drop in Hgb at 2130, down to 9.2. NSR on tele. Slight dizziness when up. Instructed to call for assist when getting up for safety. Pt calls appropriately for assist. Up in chair and ambulates in [place]y tonight. Complains of headache, Tylenol for comfort.

## 2019-01-17 ENCOUNTER — TRANSFERRED RECORDS (OUTPATIENT)
Dept: HEALTH INFORMATION MANAGEMENT | Facility: CLINIC | Age: 69
End: 2019-01-17

## 2019-01-22 ENCOUNTER — TRANSFERRED RECORDS (OUTPATIENT)
Dept: HEALTH INFORMATION MANAGEMENT | Facility: CLINIC | Age: 69
End: 2019-01-22

## 2019-02-06 ENCOUNTER — TRANSFERRED RECORDS (OUTPATIENT)
Dept: HEALTH INFORMATION MANAGEMENT | Facility: CLINIC | Age: 69
End: 2019-02-06

## 2019-02-14 ENCOUNTER — HOSPITAL ENCOUNTER (OUTPATIENT)
Dept: CT IMAGING | Facility: CLINIC | Age: 69
Discharge: HOME | End: 2019-02-14
Attending: NURSE PRACTITIONER | Admitting: NURSE PRACTITIONER
Payer: COMMERCIAL

## 2019-02-14 DIAGNOSIS — K63.89 SMALL BOWEL MASS: ICD-10-CM

## 2019-02-14 LAB
CREAT BLD-MCNC: 0.7 MG/DL (ref 0.52–1.04)
GFR SERPL CREATININE-BSD FRML MDRD: 83 ML/MIN/{1.73_M2}

## 2019-02-14 PROCEDURE — 82565 ASSAY OF CREATININE: CPT

## 2019-02-14 PROCEDURE — 25000128 H RX IP 250 OP 636: Performed by: RADIOLOGY

## 2019-02-14 PROCEDURE — 74177 CT ABD & PELVIS W/CONTRAST: CPT

## 2019-02-14 RX ORDER — IOPAMIDOL 755 MG/ML
500 INJECTION, SOLUTION INTRAVASCULAR ONCE
Status: COMPLETED | OUTPATIENT
Start: 2019-02-14 | End: 2019-02-14

## 2019-02-14 RX ADMIN — IOPAMIDOL 100 ML: 755 INJECTION, SOLUTION INTRAVENOUS at 08:50

## 2019-02-14 RX ADMIN — SODIUM CHLORIDE 55 ML: 9 INJECTION, SOLUTION INTRAVENOUS at 08:50

## 2019-03-07 ENCOUNTER — TRANSFERRED RECORDS (OUTPATIENT)
Dept: HEALTH INFORMATION MANAGEMENT | Facility: CLINIC | Age: 69
End: 2019-03-07

## 2019-08-05 ENCOUNTER — CARE COORDINATION (OUTPATIENT)
Dept: GASTROENTEROLOGY | Facility: CLINIC | Age: 69
End: 2019-08-05

## 2019-08-05 DIAGNOSIS — K92.2 GI BLEED: Primary | ICD-10-CM

## 2019-08-05 NOTE — PROGRESS NOTES
Care Coordination New Patient Referral  [facility]    NP referral date: 08/05/19  Referred to MD: [facility]    Referring MD: David Stolpman, [facility]  Referring contact see initial referral    Diagnosis: small bowel lesion  History of obscure overt GI bleeding, manifesting with melena.  2.Iron-deficiency anemia.    Imaging:   CAT scan on February 20, 2018 (images available in Epic)    CT enterography:  same structure as discussed below was visualized subsequently on a CT enterography performed February 2019. The etiology of this structure is uncertain, although it seems a likely candidate for prior obscure overt gastrointestinal bleeding.    MRI  no    Procedures:  Endoscopy notes available in procedure sections Epic    -Capsule endoscopy performed in February of 2019 showed a submucosal vascular structure in the mid small bowel   This was done at [facility]    -colonoscopy February 26, 2018    Referral has been reviewed by Dr. Metcalf; will plan for antegrade single balloon enteroscopy     Referral sent to OR scheduling on 08/05/19 at 2:15 pm via in [employer] staff message and the patient will be contacted with appointment. She will call PCP to arrange history and physical if this is to be done after 8/16.        Linda Bender  BSN, HNBC  RN Care Coordinator  [facility]  Ph: [phone]  Email: [email or web address]

## 2019-08-07 ENCOUNTER — TELEPHONE (OUTPATIENT)
Dept: GASTROENTEROLOGY | Facility: CLINIC | Age: 69
End: 2019-08-07

## 2019-08-07 NOTE — TELEPHONE ENCOUNTER
Spoke to patient in regards to scheduled procedure. Informed patient she is scheduled with Dr. Metcalf on 8/23/19. Informed patient she will need an updated pre-op physical within 30 days of her procedure. Patient stated she is going to have this done locally. Informed patient she will need a  and someone to monitor her for 24 hours after the procedure. Informed patient all scheduling details will be sent to the address listed on Epic. Address confirmed on this call.     8/7/19 359pm

## 2019-08-08 ENCOUNTER — DOCUMENTATION ONLY (OUTPATIENT)
Dept: GASTROENTEROLOGY | Facility: CLINIC | Age: 69
End: 2019-08-08

## 2019-08-08 NOTE — PROGRESS NOTES
Referring Provider and Clinic:  [facility] - Stolpman MD (RECS FROM [facility])    Diagnosis: Double balloon scopes      GI notes or primary provider notes related to GI problem:   Y     Pathology Reports: Y    Recent Lab Reports: Y    Radiology Reports (CT/MRI) : Y    Endoscopy:  Y    Colonoscopy: Y        Referral Date: 7/26/19    Date complete records received and sent for review:     Date records scanned into epic:     Provider Review Date:     Date review routed back to sender:     Letter sent:     Notes:

## 2019-08-22 ENCOUNTER — ANESTHESIA EVENT (OUTPATIENT)
Dept: SURGERY | Facility: CLINIC | Age: 69
End: 2019-08-22
Payer: COMMERCIAL

## 2019-08-23 ENCOUNTER — HOSPITAL ENCOUNTER (OUTPATIENT)
Facility: CLINIC | Age: 69
Discharge: HOME | End: 2019-08-23
Attending: INTERNAL MEDICINE | Admitting: INTERNAL MEDICINE
Payer: COMMERCIAL

## 2019-08-23 ENCOUNTER — ANESTHESIA (OUTPATIENT)
Dept: SURGERY | Facility: CLINIC | Age: 69
End: 2019-08-23
Payer: COMMERCIAL

## 2019-08-23 VITALS
WEIGHT: 233.69 LBS | HEART RATE: 89 BPM | OXYGEN SATURATION: 93 % | RESPIRATION RATE: 19 BRPM | HEIGHT: 62 IN | SYSTOLIC BLOOD PRESSURE: 138 MMHG | TEMPERATURE: 98.5 F | BODY MASS INDEX: 43 KG/M2 | DIASTOLIC BLOOD PRESSURE: 81 MMHG

## 2019-08-23 LAB — GLUCOSE BLDC GLUCOMTR-MCNC: 105 MG/DL (ref 70–99)

## 2019-08-23 PROCEDURE — 36000059 ZZH SURGERY LEVEL 3 EA 15 ADDTL MIN UMMC: Performed by: INTERNAL MEDICINE

## 2019-08-23 PROCEDURE — 25000125 ZZHC RX 250: Performed by: INTERNAL MEDICINE

## 2019-08-23 PROCEDURE — 25000566 ZZH SEVOFLURANE, EA 15 MIN: Performed by: INTERNAL MEDICINE

## 2019-08-23 PROCEDURE — 27210794 ZZH OR GENERAL SUPPLY STERILE: Performed by: INTERNAL MEDICINE

## 2019-08-23 PROCEDURE — 25000125 ZZHC RX 250: Performed by: NURSE ANESTHETIST, CERTIFIED REGISTERED

## 2019-08-23 PROCEDURE — 40000170 ZZH STATISTIC PRE-PROCEDURE ASSESSMENT II: Performed by: INTERNAL MEDICINE

## 2019-08-23 PROCEDURE — 25000128 H RX IP 250 OP 636: Performed by: ANESTHESIOLOGY

## 2019-08-23 PROCEDURE — 25800030 ZZH RX IP 258 OP 636: Performed by: ANESTHESIOLOGY

## 2019-08-23 PROCEDURE — 71000027 ZZH RECOVERY PHASE 2 EACH 15 MINS: Performed by: INTERNAL MEDICINE

## 2019-08-23 PROCEDURE — 37000008 ZZH ANESTHESIA TECHNICAL FEE, 1ST 30 MIN: Performed by: INTERNAL MEDICINE

## 2019-08-23 PROCEDURE — 36000061 ZZH SURGERY LEVEL 3 W FLUORO 1ST 30 MIN - UMMC: Performed by: INTERNAL MEDICINE

## 2019-08-23 PROCEDURE — 25000132 ZZH RX MED GY IP 250 OP 250 PS 637: Performed by: ANESTHESIOLOGY

## 2019-08-23 PROCEDURE — 82962 GLUCOSE BLOOD TEST: CPT

## 2019-08-23 PROCEDURE — 25000128 H RX IP 250 OP 636: Performed by: NURSE ANESTHETIST, CERTIFIED REGISTERED

## 2019-08-23 PROCEDURE — 37000009 ZZH ANESTHESIA TECHNICAL FEE, EACH ADDTL 15 MIN: Performed by: INTERNAL MEDICINE

## 2019-08-23 PROCEDURE — 71000014 ZZH RECOVERY PHASE 1 LEVEL 2 FIRST HR: Performed by: INTERNAL MEDICINE

## 2019-08-23 RX ORDER — LIDOCAINE 40 MG/G
CREAM TOPICAL
Status: DISCONTINUED | OUTPATIENT
Start: 2019-08-23 | End: 2019-08-23 | Stop reason: HOSPADM

## 2019-08-23 RX ORDER — FENTANYL CITRATE 50 UG/ML
INJECTION, SOLUTION INTRAMUSCULAR; INTRAVENOUS PRN
Status: DISCONTINUED | OUTPATIENT
Start: 2019-08-23 | End: 2019-08-23

## 2019-08-23 RX ORDER — DIMENHYDRINATE 50 MG/ML
25 INJECTION, SOLUTION INTRAMUSCULAR; INTRAVENOUS
Status: DISCONTINUED | OUTPATIENT
Start: 2019-08-23 | End: 2019-08-23 | Stop reason: HOSPADM

## 2019-08-23 RX ORDER — ALBUTEROL SULFATE 0.83 MG/ML
2.5 SOLUTION RESPIRATORY (INHALATION) EVERY 4 HOURS PRN
Status: DISCONTINUED | OUTPATIENT
Start: 2019-08-23 | End: 2019-08-23 | Stop reason: HOSPADM

## 2019-08-23 RX ORDER — HYDRALAZINE HYDROCHLORIDE 20 MG/ML
2.5-5 INJECTION INTRAMUSCULAR; INTRAVENOUS EVERY 10 MIN PRN
Status: DISCONTINUED | OUTPATIENT
Start: 2019-08-23 | End: 2019-08-23 | Stop reason: HOSPADM

## 2019-08-23 RX ORDER — METOPROLOL TARTRATE 1 MG/ML
1-2 INJECTION, SOLUTION INTRAVENOUS EVERY 5 MIN PRN
Status: DISCONTINUED | OUTPATIENT
Start: 2019-08-23 | End: 2019-08-23 | Stop reason: HOSPADM

## 2019-08-23 RX ORDER — ONDANSETRON 2 MG/ML
INJECTION INTRAMUSCULAR; INTRAVENOUS PRN
Status: DISCONTINUED | OUTPATIENT
Start: 2019-08-23 | End: 2019-08-23

## 2019-08-23 RX ORDER — SODIUM CHLORIDE, SODIUM LACTATE, POTASSIUM CHLORIDE, CALCIUM CHLORIDE 600; 310; 30; 20 MG/100ML; MG/100ML; MG/100ML; MG/100ML
INJECTION, SOLUTION INTRAVENOUS CONTINUOUS
Status: DISCONTINUED | OUTPATIENT
Start: 2019-08-23 | End: 2019-08-23 | Stop reason: HOSPADM

## 2019-08-23 RX ORDER — FLUMAZENIL 0.1 MG/ML
0.2 INJECTION, SOLUTION INTRAVENOUS
Status: DISCONTINUED | OUTPATIENT
Start: 2019-08-23 | End: 2019-08-23 | Stop reason: HOSPADM

## 2019-08-23 RX ORDER — PROPOFOL 10 MG/ML
INJECTION, EMULSION INTRAVENOUS PRN
Status: DISCONTINUED | OUTPATIENT
Start: 2019-08-23 | End: 2019-08-23

## 2019-08-23 RX ORDER — NALOXONE HYDROCHLORIDE 0.4 MG/ML
.1-.4 INJECTION, SOLUTION INTRAMUSCULAR; INTRAVENOUS; SUBCUTANEOUS
Status: DISCONTINUED | OUTPATIENT
Start: 2019-08-23 | End: 2019-08-23 | Stop reason: HOSPADM

## 2019-08-23 RX ORDER — MEPERIDINE HYDROCHLORIDE 25 MG/ML
12.5 INJECTION INTRAMUSCULAR; INTRAVENOUS; SUBCUTANEOUS
Status: DISCONTINUED | OUTPATIENT
Start: 2019-08-23 | End: 2019-08-23 | Stop reason: HOSPADM

## 2019-08-23 RX ORDER — FENTANYL CITRATE 50 UG/ML
25-50 INJECTION, SOLUTION INTRAMUSCULAR; INTRAVENOUS
Status: DISCONTINUED | OUTPATIENT
Start: 2019-08-23 | End: 2019-08-23 | Stop reason: HOSPADM

## 2019-08-23 RX ORDER — ONDANSETRON 2 MG/ML
4 INJECTION INTRAMUSCULAR; INTRAVENOUS
Status: DISCONTINUED | OUTPATIENT
Start: 2019-08-23 | End: 2019-08-23 | Stop reason: HOSPADM

## 2019-08-23 RX ORDER — HYDROMORPHONE HYDROCHLORIDE 1 MG/ML
.3-.5 INJECTION, SOLUTION INTRAMUSCULAR; INTRAVENOUS; SUBCUTANEOUS EVERY 10 MIN PRN
Status: DISCONTINUED | OUTPATIENT
Start: 2019-08-23 | End: 2019-08-23 | Stop reason: HOSPADM

## 2019-08-23 RX ORDER — ACETAMINOPHEN 325 MG/1
650 TABLET ORAL ONCE
Status: COMPLETED | OUTPATIENT
Start: 2019-08-23 | End: 2019-08-23

## 2019-08-23 RX ORDER — ONDANSETRON 4 MG/1
4 TABLET, ORALLY DISINTEGRATING ORAL EVERY 30 MIN PRN
Status: DISCONTINUED | OUTPATIENT
Start: 2019-08-23 | End: 2019-08-23 | Stop reason: HOSPADM

## 2019-08-23 RX ORDER — ONDANSETRON 4 MG/1
4 TABLET, ORALLY DISINTEGRATING ORAL EVERY 6 HOURS PRN
Status: DISCONTINUED | OUTPATIENT
Start: 2019-08-23 | End: 2019-08-23 | Stop reason: HOSPADM

## 2019-08-23 RX ORDER — LIDOCAINE HYDROCHLORIDE 20 MG/ML
INJECTION, SOLUTION INFILTRATION; PERINEURAL PRN
Status: DISCONTINUED | OUTPATIENT
Start: 2019-08-23 | End: 2019-08-23

## 2019-08-23 RX ORDER — ONDANSETRON 2 MG/ML
4 INJECTION INTRAMUSCULAR; INTRAVENOUS EVERY 6 HOURS PRN
Status: DISCONTINUED | OUTPATIENT
Start: 2019-08-23 | End: 2019-08-23 | Stop reason: HOSPADM

## 2019-08-23 RX ORDER — ONDANSETRON 2 MG/ML
4 INJECTION INTRAMUSCULAR; INTRAVENOUS EVERY 30 MIN PRN
Status: DISCONTINUED | OUTPATIENT
Start: 2019-08-23 | End: 2019-08-23 | Stop reason: HOSPADM

## 2019-08-23 RX ORDER — DEXAMETHASONE SODIUM PHOSPHATE 4 MG/ML
INJECTION, SOLUTION INTRA-ARTICULAR; INTRALESIONAL; INTRAMUSCULAR; INTRAVENOUS; SOFT TISSUE PRN
Status: DISCONTINUED | OUTPATIENT
Start: 2019-08-23 | End: 2019-08-23

## 2019-08-23 RX ADMIN — ROCURONIUM BROMIDE 50 MG: 10 INJECTION INTRAVENOUS at 07:51

## 2019-08-23 RX ADMIN — FENTANYL CITRATE 100 MCG: 50 INJECTION, SOLUTION INTRAMUSCULAR; INTRAVENOUS at 07:51

## 2019-08-23 RX ADMIN — FENTANYL CITRATE 25 MCG: 50 INJECTION, SOLUTION INTRAMUSCULAR; INTRAVENOUS at 08:21

## 2019-08-23 RX ADMIN — DEXAMETHASONE SODIUM PHOSPHATE 4 MG: 4 INJECTION, SOLUTION INTRA-ARTICULAR; INTRALESIONAL; INTRAMUSCULAR; INTRAVENOUS; SOFT TISSUE at 08:06

## 2019-08-23 RX ADMIN — ACETAMINOPHEN 650 MG: 325 TABLET, FILM COATED ORAL at 10:02

## 2019-08-23 RX ADMIN — SODIUM CHLORIDE, POTASSIUM CHLORIDE, SODIUM LACTATE AND CALCIUM CHLORIDE: 600; 310; 30; 20 INJECTION, SOLUTION INTRAVENOUS at 07:09

## 2019-08-23 RX ADMIN — ONDANSETRON 4 MG: 2 INJECTION INTRAMUSCULAR; INTRAVENOUS at 08:06

## 2019-08-23 RX ADMIN — SUGAMMADEX 200 MG: 100 INJECTION, SOLUTION INTRAVENOUS at 08:49

## 2019-08-23 RX ADMIN — LIDOCAINE HYDROCHLORIDE 100 MG: 20 INJECTION, SOLUTION INFILTRATION; PERINEURAL at 07:51

## 2019-08-23 RX ADMIN — PROPOFOL 150 MG: 10 INJECTION, EMULSION INTRAVENOUS at 07:51

## 2019-08-23 RX ADMIN — ONDANSETRON 4 MG: 2 INJECTION INTRAMUSCULAR; INTRAVENOUS at 09:21

## 2019-08-23 ASSESSMENT — MIFFLIN-ST. JEOR: SCORE: 1528.25

## 2019-08-23 ASSESSMENT — PAIN DESCRIPTION - DESCRIPTORS
DESCRIPTORS: HEADACHE
DESCRIPTORS: HEADACHE

## 2019-08-23 NOTE — ANESTHESIA POSTPROCEDURE EVALUATION
Anesthesia POST Procedure Evaluation    Patient: Michelle Pedersen   MRN:     [id number] Gender:   female   Age:    69 year old :      1950        Preoperative Diagnosis: Gastrointestinal Bleeding   Procedure(s):  Antegrade Single Balloon Enteroscopy with hemoclips and endoscopic tattoo   Postop Comments: No value filed.       Anesthesia Type:  Not documented  General    Reportable Event: NO     PAIN: Uncomplicated   Sign Out status: Comfortable, Well controlled pain     PONV: No PONV   Sign Out status:  No Nausea or Vomiting     Neuro/Psych: Uneventful perioperative course   Sign Out Status: Preoperative baseline; Age appropriate mentation     Airway/Resp.: Uneventful perioperative course   Sign Out Status: Non labored breathing, age appropriate RR; Resp. Status within EXPECTED Parameters     CV: Uneventful perioperative course   Sign Out status: Appropriate BP and perfusion indices; Appropriate HR/Rhythm     Disposition:   Sign Out in:  PACU  Disposition:  Phase II; Home  Recovery Course: Uneventful  Follow-Up: Not required           Last Anesthesia Record Vitals:  CRNA VITALS  2019 0828 - 2019 0928      2019             Resp Rate (observed):  2  (Abnormal)           Last PACU Vitals:  Vitals Value Taken Time   /66 2019  9:50 AM   Temp 36.9  C (98.5  F) 2019  9:30 AM   Pulse 87 2019  9:50 AM   Resp 15 2019  9:53 AM   SpO2 93 % 2019  9:53 AM   Temp src     NIBP     Pulse     SpO2     Resp     Temp     Ht Rate     Temp 2     Vitals shown include unvalidated device data.      Electronically Signed By: Bertha Duran MD, 2019, 9:54 AM

## 2019-08-23 NOTE — ANESTHESIA CARE TRANSFER NOTE
Patient: Michelle Pedersen    Procedure(s):  Antegrade Single Balloon Enteroscopy with hemoclips and endoscopic tattoo    Diagnosis: Gastrointestinal Bleeding  Diagnosis Additional Information: No value filed.    Anesthesia Type:   General     Note:  Airway :Face Mask  Patient transferred to:PACU  Comments: VSS.  Report given to RN.Handoff Report: Identifed the Patient, Identified the Reponsible Provider, Reviewed the pertinent medical history, Discussed the surgical course, Reviewed Intra-OP anesthesia mangement and issues during anesthesia, Set expectations for post-procedure period and Allowed opportunity for questions and acknowledgement of understanding      Vitals: (Last set prior to Anesthesia Care Transfer)    CRNA VITALS  8/23/2019 0828 - 8/23/2019 0903      8/23/2019             Resp Rate (observed):  Sat  BP  HR 10  100  155/78  92                Electronically Signed By: ROSALINDA Oliveros CRNA  August 23, 2019  9:03 AM

## 2019-08-23 NOTE — BRIEF OP NOTE
[facility] Brief Operative Note    Pre-operative diagnosis: Gastrointestinal Bleeding   Post-operative diagnosis Submucosal lesion of small bowel   Procedure: Procedure(s):  Antegrade Single Balloon Enteroscopy with hemoclips and endoscopic tattoo   Surgeon: Genaro Metcalf MD   Assistants(s): Km Soto MD   Estimated blood loss: None    Specimens: None   Findings: -Esophagitis  -Fundic gland polyps  -A pulsatile  mucosal lesions, likely a vessel seen in small bowel. It was marked with clips and tattoo.     Recommendations:    -Monitor patient in PACU as per protocol with likely discharge today.       -Resume previous medication and previous diet.       -Repeat CT Angio to confirm that the lesion corresponds to the previous CT finding.        Km Soto MD  Interventional Endoscopy Fellow

## 2019-08-23 NOTE — ANESTHESIA PREPROCEDURE EVALUATION
Anesthesia Pre-Procedure Evaluation    Patient: Michelle Pedersen   MRN:     [id number] Gender:   female   Age:    69 year old :      1950        Preoperative Diagnosis: Gastrointestinal Bleeding   Procedure(s):  Antegrade Single Balloon Enteroscopy     Past Medical History:   Diagnosis Date     Breast cancer (H)      Depression      High cholesterol      Hypothyroid       Past Surgical History:   Procedure Laterality Date     COLONOSCOPY N/A 2018    Procedure: COLONOSCOPY;  COLONOSCOPY  Rm 509;  Surgeon: Nadia York MD;  Location:  GI     ESOPHAGOSCOPY, GASTROSCOPY, DUODENOSCOPY (EGD), COMBINED  10/27/2011    Procedure:COMBINED ESOPHAGOSCOPY, GASTROSCOPY, DUODENOSCOPY (EGD), BIOPSY SINGLE OR MULTIPLE; Esophagoscopy, Gastroscopy, Duodenoscopy cold biopsy ; Surgeon:NADIA MORALES; Location: GI     ESOPHAGOSCOPY, GASTROSCOPY, DUODENOSCOPY (EGD), COMBINED N/A 2018    Procedure: COMBINED ESOPHAGOSCOPY, GASTROSCOPY, DUODENOSCOPY (EGD), BIOPSY SINGLE OR MULTIPLE;  ESOPHAGOSCOPY, GASTROSCOPY, DUODENOSCOPY with cold biospy;  Surgeon: Nadia York MD;  Location:  GI     ESOPHAGOSCOPY, GASTROSCOPY, DUODENOSCOPY (EGD), COMBINED N/A 2018    Procedure: ESOPHAGOSCOPY, GASTROSCOPY, DUODENOSCOPY (EGD) Rm 546;  Surgeon: Ann Burleson MD;  Location:  GI          Anesthesia Evaluation     . Pt has had prior anesthetic. Type: General and MAC    No history of anesthetic complications          ROS/MED HX    ENT/Pulmonary:     (+)BEN risk factors snores loudly, obese, , . .    Neurologic:  - neg neurologic ROS     Cardiovascular:     (+) Dyslipidemia, ----. : . . . :. . Previous cardiac testing Echodate:results:EF=70%, no valve dzdate: results:ECG reviewed date:8/15/19 results:NSR 84, LAD, +/- LVH date: results:          METS/Exercise Tolerance:  4 - Raking leaves, gardening   Hematologic:     (+) Anemia (8/15/19 Hb=13.0), -      Musculoskeletal:  - neg musculoskeletal ROS        GI/Hepatic: Comment: GIB-melena     (+) GERD       Renal/Genitourinary:  - ROS Renal section negative       Endo:     (+) thyroid problem hypothyroidism, Obesity (BMI=42), .      Psychiatric:     (+) psychiatric history depression      Infectious Disease:  - neg infectious disease ROS       Malignancy:   (+) Malignancy History of Breast  Breast CA Remission status post Surgery.         Other:                         PHYSICAL EXAM:   Mental Status/Neuro: A/A/O   Airway: Facies: Thick Neck  Mallampati: II  Mouth/Opening: Full  TM distance: > 6 cm  Neck ROM: Full   Respiratory: Auscultation: CTAB     Resp. Rate: Normal     Resp. Effort: Normal      CV: Rhythm: Regular  Rate: Age appropriate  Heart: Normal Sounds  Edema: None   Comments:      Dental: Normal Dentition Habitus: Obesity; Morbid               LABS:  CBC:   Lab Results   Component Value Date    WBC 6.5 12/14/2018    WBC 8.8 12/12/2018    HGB 9.5 (L) 12/14/2018    HGB 9.2 (L) 12/13/2018    HCT 30.0 (L) 12/14/2018    HCT 32.9 (L) 12/12/2018     12/14/2018     12/12/2018     BMP:   Lab Results   Component Value Date     12/14/2018     12/13/2018    POTASSIUM 4.5 12/14/2018    POTASSIUM 3.6 12/13/2018    CHLORIDE 110 (H) 12/14/2018    CHLORIDE 110 (H) 12/13/2018    CO2 25 12/14/2018    CO2 26 12/13/2018    BUN 6 (L) 12/14/2018    BUN 9 12/13/2018    CR 0.56 12/14/2018    CR 0.55 12/13/2018    GLC 81 12/14/2018    GLC 93 12/13/2018     COAGS:   Lab Results   Component Value Date    PTT 30 12/12/2018    INR 1.02 12/12/2018     POC:   Lab Results   Component Value Date     (H) 08/23/2019     OTHER:   Lab Results   Component Value Date    LACT 0.7 12/12/2018    VIRI 8.3 (L) 12/14/2018    MAG 2.1 12/13/2018    ALBUMIN 3.1 (L) 12/12/2018    PROTTOTAL 6.8 12/12/2018    ALT 16 12/12/2018    AST 11 12/12/2018    ALKPHOS 71 12/12/2018    BILITOTAL 0.3 12/12/2018    LIPASE 89 12/05/2018        Preop Vitals    BP Readings from Last 3  "Encounters:   12/14/18 156/74   12/05/18 145/79   02/28/18 116/53    Pulse Readings from Last 3 Encounters:   12/13/18 92   12/05/18 115   02/27/18 100      Resp Readings from Last 3 Encounters:   12/14/18 17   12/05/18 16   02/28/18 20    SpO2 Readings from Last 3 Encounters:   12/14/18 97%   12/05/18 95%   02/28/18 94%      Temp Readings from Last 1 Encounters:   12/14/18 36.7  C (98.1  F) (Oral)    Ht Readings from Last 1 Encounters:   12/12/18 1.549 m (5' 1\")      Wt Readings from Last 1 Encounters:   12/14/18 100.2 kg (220 lb 12.8 oz)    Estimated body mass index is 41.72 kg/m  as calculated from the following:    Height as of 12/12/18: 1.549 m (5' 1\").    Weight as of 12/14/18: 100.2 kg (220 lb 12.8 oz).     LDA:  Peripheral IV 12/12/18 Right (Active)   Number of days: 254       Peripheral IV 12/12/18 Right (Active)   Number of days: 254        Assessment:   ASA SCORE: 3    H&P: History and physical reviewed and following examination; no interval change.   Smoking Status:  Non-Smoker/Unknown   NPO Status: NPO Appropriate     Plan:   Anes. Type:  General   Pre-Medication: None   Induction:  IV (Standard)   Airway: ETT; Oral   Access/Monitoring: PIV   Maintenance: Balanced     Postop Plan:   Postop Pain: Opioids  Postop Sedation/Airway: Not planned  Disposition: Outpatient     PONV Management:   Adult Risk Factors: Female, Non-Smoker, Postop Opioids   Prevention: Ondansetron, Dexamethasone     CONSENT: Direct conversation   Plan and risks discussed with: Patient                      Bertha Duran MD  "

## 2019-08-23 NOTE — DISCHARGE INSTRUCTIONS
REMEMBER: SAME DAY SURGERY IS NOT SAME DAY RECOVERY. TAKE IT EASY, GET PLENTY OF REST, AND HAVE SOMEONE WITH YOU FOR 24 HOURS. FOLLOW THESE INSTRUCTIONS AND PLEASE DO NOT HESITATE TO CALL WITH ANY QUESTIONS.   - Resume previous diet.   - Continue present medications.   - Repeat CT with contrast to confirm that the endoscopic lesion coresponds to the CT finding      [facility], [place]  Same-Day Surgery   Adult Discharge Orders & Instructions     For 24 hours after surgery    1. Get plenty of rest.  A responsible adult must stay with you for at least 24 hours after you leave the hospital.   2. Do not drive or use heavy equipment.  If you have weakness or tingling, don't drive or use heavy equipment until this feeling goes away.  3. Do not drink alcohol.  4. Avoid strenuous or risky activities.  Ask for help when climbing stairs.   5. You may feel lightheaded.  IF so, sit for a few minutes before standing.  Have someone help you get up.   6. If you have nausea (feel sick to your stomach): Drink only clear liquids such as apple juice, ginger ale, broth or 7-Up.  Rest may also help.  Be sure to drink enough fluids.  Move to a regular diet as you feel able.  7. You may have a slight fever. Call the doctor if your fever is over 100 F (37.7 C) (taken under the tongue) or lasts longer than 24 hours.  8. You may have a dry mouth, a sore throat, muscle aches or trouble sleeping.  These should go away after 24 hours.  9. Do not make important or legal decisions.   Call your doctor for any of the followin.  Signs of infection (fever, growing tenderness at the surgery site, a large amount of drainage or bleeding, severe pain, foul-smelling drainage, redness, swelling).    2. It has been over 8 to 10 hours since surgery and you are still not able to urinate (pass water).    3.  Headache for over 24 hours.    To contact a doctor, call Dr. Dixon waldron at [phone]  or:        [phone] and  ask for the resident on call for Gastroenterology (answered 24 hours a day)      Emergency Department:    [facility]: [phone]       (TTY for hearing impaired: [phone])

## 2019-08-25 LAB — SMALL BOWEL ENTEROSCOPY: NORMAL

## 2023-10-15 NOTE — OR NURSING
HISTORY AND PHYSICAL   [facility]        Date of Admission: 2023  Patient Identification:  Name: Catherine Boyer  Age: 82 y.o.  Sex: female  :  1940  MRN: [id number]                     Primary Care Physician: Kristi Moreau APRN    Chief Complaint:  82 year old female who presented to the emergency room with abdominal pain and black stool which have been present for several days; she was recently admitted and had an egd done; she has had nausea but presently denies pain or nausea; no fever or chills     History of Present Illness:   As above    Past Medical History:  Past Medical History:   Diagnosis Date   • Atrial fibrillation (HCC)    • Breast cancer (HCC)    • CAD (coronary artery disease)     NSTEMI 2022: 90% ostial LAD, 99% D1, 70% mid-distal LAD (medical therapy). She received two stents (2.5x18 and 2.5x26mm [place] LEFTY) but I don't know which one went to which lesion.   • Carotid atherosclerosis    • Chronic diastolic (congestive) heart failure (HCC)    • GERD (gastroesophageal reflux disease)    • Glaucoma    • History of cataract    • Hypertension    • Microcytic anemia     per Dr. oleg pate office  note 22-dd   • Myeloproliferative disorder (HCC)     JAK2 positive   • Nonbacterial thrombotic endocarditis     2021: 4x5mm vegetation on the ventricular surface of the anterior MV, negative blood cultures   • Porcelain gallbladder    • PUD (peptic ulcer disease)    • TIA (transient ischemic attack)    • Type 2 diabetes mellitus (HCC)    • Upper GI bleed      Past Surgical History:  Past Surgical History:   Procedure Laterality Date   • BREAST LUMPECTOMY     • CARDIAC CATHETERIZATION N/A 2022    Procedure: Coronary angiography;  Surgeon: Guero Verde MD;  Location:  [facility];  Service: Cardiovascular;  Laterality: N/A;   • CARDIAC CATHETERIZATION N/A 2022    Procedure: Stent LEFTY coronary;  Surgeon: Guero Verde MD;  Location:   Patient transferred to Radiology for Ultrasound prior to transfer back to Room 546. Patient is alert and awake, denies pain. Daughter at bedside.   [facility];  Service: Cardiovascular;  Laterality: N/A;   • CATARACT EXTRACTION  2011   • CHOLECYSTECTOMY WITH INTRAOPERATIVE CHOLANGIOGRAM N/A 11/28/2022    Procedure: CHOLECYSTECTOMY LAPAROSCOPIC INTRAOPERATIVE CHOLANGIOGRAM;  Surgeon: Dani Grover Jr., MD;  Location: [facility] MAIN OR;  Service: General;  Laterality: N/A;   • ENDOSCOPY N/A 1/25/2023    Procedure: ESOPHAGOGASTRODUODENOSCOPY WITH BIOPSIES;  Surgeon: Charles Diaz MD;  Location: [facility] ENDOSCOPY;  Service: Gastroenterology;  Laterality: N/A;  pre: abd pain, nausea  post: hiatal hernia, mild gastritis, sloughing of the esophagus   • JOINT REPLACEMENT        Home Meds:  Medications Prior to Admission   Medication Sig Dispense Refill Last Dose   • apixaban (Eliquis) 5 MG tablet tablet Take 1 tablet by mouth Every 12 (Twelve) Hours. 180 tablet 3 2/3/2023   • atorvastatin (LIPITOR) 80 MG tablet Take 1 tablet by mouth Every Night. 90 tablet 0 2/3/2023   • bimatoprost (LUMIGAN) 0.01 % ophthalmic drops Apply 1 drop to eye(s) as directed by provider.   2/4/2023   • clopidogrel (PLAVIX) 75 MG tablet Take 1 tablet by mouth Daily. 30 tablet 0 2/4/2023   • empagliflozin (JARDIANCE) 10 MG tablet tablet Take 1 tablet by mouth Daily. 90 tablet 3 2/4/2023   • furosemide (LASIX) 40 MG tablet Take 1 tablet by mouth Daily. 90 tablet 3 2/4/2023   • Jakafi 15 MG chemo tablet Take 1 tablet by mouth 2 (Two) Times a Day. 60 tablet 3 2/4/2023   • latanoprost (XALATAN) 0.005 % ophthalmic solution Administer 1 drop to both eyes.   2/4/2023   • metoprolol succinate XL (TOPROL-XL) 25 MG 24 hr tablet 25 mg 2 (Two) Times a Day.   2/4/2023   • nystatin (MYCOSTATIN) 100,000 unit/mL suspension Swish and swallow 5 mL 4 (Four) Times a Day for 14 days. 60 mL 0 2/4/2023   • ondansetron (ZOFRAN) 4 MG tablet Take 1 tablet by mouth Every 6 (Six) Hours As Needed for Nausea or Vomiting. 30 tablet 0    • pantoprazole (PROTONIX) 40 MG EC tablet Take 40 mg by mouth Daily.    "2/4/2023   • sertraline (ZOLOFT) 50 MG tablet Take 50 mg by mouth Daily.   2/4/2023   • acetaminophen (TYLENOL) 500 MG tablet Take 500 mg by mouth As Needed.   More than a month       Allergies:  Allergies   Allergen Reactions   • Aspirin Unknown - Low Severity   • Oxycodone Unknown - Low Severity   • Diphenhydramine Hcl Anxiety and Unknown (See Comments)     Benadryl IVP     Immunizations:  Immunization History   Administered Date(s) Administered   • Pneumococcal Conjugate 13-Valent (PCV13) 07/08/2021     Social History:   Social History     Social History Narrative   • Not on file     Social History     Socioeconomic History   • Marital status:    Tobacco Use   • Smoking status: Former     Packs/day: 1.00     Years: 20.00     Pack years: 20.00     Types: Cigarettes   • Smokeless tobacco: Never   • Tobacco comments:     30  years ago   Vaping Use   • Vaping Use: Never used   Substance and Sexual Activity   • Alcohol use: Yes     Comment: \"rare\"   • Drug use: Never   • Sexual activity: Defer       Family History:  Family History   Problem Relation Age of Onset   • Ovarian cancer Mother    • Lung cancer Father    • Lung cancer Sister    • Malig Hyperthermia Neg Hx         Review of Systems  See history of present illness and past medical history.  Patient denies headache, dizziness, syncope, falls, trauma, change in vision, change in hearing, change in taste, changes in weight, changes in appetite, focal weakness, numbness, or paresthesia.  Patient denies chest pain, palpitations, dyspnea, orthopnea, PND, cough, sinus pressure, rhinorrhea, epistaxis, hemoptysis, nausea, vomiting,hematemesis, diarrhea, constipation or hematchezia.  Denies cold or heat intolerance, polydipsia, polyuria, polyphagia. Denies hematuria, pyuria, dysuria, hesitancy, frequency or urgency. Denies consumption of raw and under cooked meats foods or change in water source.  Denies fever, chills, sweats, night sweats.  Denies missing any " "routine medications. Remainder of ROS is negative.    Objective:  T Max 24 hrs: Temp (24hrs), Av.7 °F (37.1 °C), Min:98.3 °F (36.8 °C), Max:99.2 °F (37.3 °C)    Vitals Ranges:   Temp:  [98.3 °F (36.8 °C)-99.2 °F (37.3 °C)] 98.6 °F (37 °C)  Heart Rate:  [84-97] 84  Resp:  [12-18] 18  BP: (107-147)/(48-76) 114/65      Exam:  /65 (BP Location: Right arm, Patient Position: Lying)   Pulse 84   Temp 98.6 °F (37 °C) (Oral)   Resp 18   Ht 154.9 cm (61\")   Wt 66.7 kg (147 lb)   SpO2 98%   BMI 27.78 kg/m²     General Appearance:    Alert, cooperative, no distress, appears stated age   Head:    Normocephalic, without obvious abnormality, atraumatic   Eyes:    PERRL, conjunctivae/corneas clear, EOM's intact, both eyes   Ears:    Normal external ear canals, both ears   Nose:   Nares normal, septum midline, mucosa normal, no drainage    or sinus tenderness   Throat:   Lips, mucosa, and tongue normal   Neck:   Supple, symmetrical, trachea midline, no adenopathy;     thyroid:  no enlargement/tenderness/nodules; no carotid    bruit or JVD   Back:     Symmetric, no curvature, ROM normal, no CVA tenderness   Lungs:     Clear to auscultation bilaterally, respirations unlabored   Chest Wall:    No tenderness or deformity    Heart:    Regular rate and rhythm, S1 and S2 normal, no murmur, rub   or gallop   Abdomen:     Soft, nontender, bowel sounds active all four quadrants,     no masses, no hepatomegaly, no splenomegaly   Extremities:   Extremities normal, atraumatic, no cyanosis or edema   Pulses:   2+ and symmetric all extremities   Skin:   Skin color, texture, turgor normal, no rashes or lesions               .    Data Review:  Labs in chart were reviewed.  WBC   Date Value Ref Range Status   2023 56.36 (C) 3.40 - 10.80 10*3/mm3 Final     Hemoglobin   Date Value Ref Range Status   2023 6.1 (C) 12.0 - 15.9 g/dL Final     Hematocrit   Date Value Ref Range Status   2023 18.4 (C) 34.0 - 46.6 % Final "     Platelets   Date Value Ref Range Status   02/04/2023 385 140 - 450 10*3/mm3 Final     Sodium   Date Value Ref Range Status   02/04/2023 136 136 - 145 mmol/L Final     Potassium   Date Value Ref Range Status   02/04/2023 4.3 3.5 - 5.2 mmol/L Final     Comment:     Slight hemolysis detected by analyzer. Results may be affected.     Chloride   Date Value Ref Range Status   02/04/2023 100 98 - 107 mmol/L Final     CO2   Date Value Ref Range Status   02/04/2023 26.0 22.0 - 29.0 mmol/L Final     BUN   Date Value Ref Range Status   02/04/2023 13 8 - 23 mg/dL Final     Creatinine   Date Value Ref Range Status   02/04/2023 0.76 0.57 - 1.00 mg/dL Final     Glucose   Date Value Ref Range Status   02/04/2023 119 (H) 65 - 99 mg/dL Final     Calcium   Date Value Ref Range Status   02/04/2023 8.6 8.6 - 10.5 mg/dL Final                Imaging Results (All)     Procedure Component Value Units Date/Time    CT Abdomen Pelvis With Contrast [[id number]] Collected: 02/04/23 1607     Updated: 02/04/23 1617    Narrative:      CT ABDOMEN AND PELVIS WITH IV CONTRAST     HISTORY: Abdominal pain. Black stool.     TECHNIQUE:  CT includes axial imaging from the lung bases to the  trochanters with intravenous contrast and without use of oral contrast.  Data reconstructed in coronal and sagittal planes. Radiation dose  reduction techniques were utilized, including automated exposure control  and exposure modulation based on body size.     COMPARISON: MRI abdomen 01/25/2023, CT abdomen and pelvis with IV  contrast 01/23/2023, CT abdomen and pelvis with IV contrast 10/29/2022.     FINDINGS: Heart size is enlarged. Lung bases appear clear. Liver,  spleen, and adrenal glands appear within normal limits. 1.2 cm right  upper pole renal low-density lesion that contains fat density is  consistent with angiomyolipoma and this is without change. There is a  posterior left renal low-density lesion measuring 9 mm and this is most  likely a cyst. Pancreas  appears within normal limits. There is no bowel  dilatation or evidence for bowel obstruction. Sigmoid diverticulosis is  present without evidence for diverticulitis. There is no evidence for  intra-abdominal abscess formation. There is advanced multilevel  degenerative disc disease within the lower thoracic spine and lumbar  spine associated with vacuum phenomena.       Impression:      1. No evidence for acute abnormality of the abdomen or pelvis.  2. Small hiatal hernia.  3. 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 Suspect small left  renal cyst.  4. Previous cholecystectomy.  5. Sigmoid diverticulosis without evidence for diverticulitis.     Radiation dose reduction techniques were utilized, including automated  exposure control and exposure modulation based on body size.     This report was finalized on 2/4/2023 4:13 PM by Dr. Torey Bedoya M.D.               Assessment:  Active Hospital Problems    Diagnosis  POA   • **ABLA (acute blood loss anemia) [D62]  Yes      Resolved Hospital Problems   No resolved problems to display.   epigastric pain  A fib  Cad  Gi bleed  Hypertension  chronic diastolic chf  Atrial fibrillation  hyperglycemia    Plan:  Transfuse  ppi  Trend hgb  Appreciate gi input  Ask hematology to see her  Ask cardiology to see her regarding need for plavix  Monitor on telemetry  dw patient and ED provider    Jaimee Macias MD  2/4/2023  20:28 EST     Universal Safety Interventions

## 2025-02-24 ENCOUNTER — APPOINTMENT (OUTPATIENT)
Dept: GENERAL RADIOLOGY | Facility: CLINIC | Age: 75
End: 2025-02-24
Attending: EMERGENCY MEDICINE
Payer: COMMERCIAL

## 2025-02-24 ENCOUNTER — HOSPITAL ENCOUNTER (EMERGENCY)
Facility: CLINIC | Age: 75
Discharge: HOME | End: 2025-02-24
Attending: EMERGENCY MEDICINE | Admitting: EMERGENCY MEDICINE
Payer: COMMERCIAL

## 2025-02-24 VITALS
DIASTOLIC BLOOD PRESSURE: 96 MMHG | TEMPERATURE: 97.2 F | RESPIRATION RATE: 20 BRPM | OXYGEN SATURATION: 100 % | BODY MASS INDEX: 41.34 KG/M2 | HEART RATE: 80 BPM | WEIGHT: 224.65 LBS | SYSTOLIC BLOOD PRESSURE: 154 MMHG | HEIGHT: 62 IN

## 2025-02-24 DIAGNOSIS — M47.812 CERVICAL SPINE ARTHRITIS: ICD-10-CM

## 2025-02-24 DIAGNOSIS — M54.2 NECK PAIN ON RIGHT SIDE: ICD-10-CM

## 2025-02-24 LAB
ANION GAP SERPL CALCULATED.3IONS-SCNC: 10 MMOL/L (ref 7–15)
BASOPHILS # BLD AUTO: 0 10E3/UL (ref 0–0.2)
BASOPHILS NFR BLD AUTO: 1 %
BUN SERPL-MCNC: 13.2 MG/DL (ref 8–23)
CALCIUM SERPL-MCNC: 9.4 MG/DL (ref 8.8–10.4)
CHLORIDE SERPL-SCNC: 101 MMOL/L (ref 98–107)
CREAT SERPL-MCNC: 0.7 MG/DL (ref 0.51–0.95)
EGFRCR SERPLBLD CKD-EPI 2021: 89 ML/MIN/1.73M2
EOSINOPHIL # BLD AUTO: 0.1 10E3/UL (ref 0–0.7)
EOSINOPHIL NFR BLD AUTO: 1 %
ERYTHROCYTE [DISTWIDTH] IN BLOOD BY AUTOMATED COUNT: 15.6 % (ref 10–15)
GLUCOSE SERPL-MCNC: 114 MG/DL (ref 70–99)
HCO3 SERPL-SCNC: 25 MMOL/L (ref 22–29)
HCT VFR BLD AUTO: 43.7 % (ref 35–47)
HGB BLD-MCNC: 14 G/DL (ref 11.7–15.7)
HOLD SPECIMEN: NORMAL
HOLD SPECIMEN: NORMAL
IMM GRANULOCYTES # BLD: 0.1 10E3/UL
IMM GRANULOCYTES NFR BLD: 1 %
LYMPHOCYTES # BLD AUTO: 2.5 10E3/UL (ref 0.8–5.3)
LYMPHOCYTES NFR BLD AUTO: 28 %
MCH RBC QN AUTO: 28.2 PG (ref 26.5–33)
MCHC RBC AUTO-ENTMCNC: 32 G/DL (ref 31.5–36.5)
MCV RBC AUTO: 88 FL (ref 78–100)
MONOCYTES # BLD AUTO: 0.6 10E3/UL (ref 0–1.3)
MONOCYTES NFR BLD AUTO: 6 %
NEUTROPHILS # BLD AUTO: 5.5 10E3/UL (ref 1.6–8.3)
NEUTROPHILS NFR BLD AUTO: 63 %
NRBC # BLD AUTO: 0 10E3/UL
NRBC BLD AUTO-RTO: 0 /100
NT-PROBNP SERPL-MCNC: 216 PG/ML (ref 0–1800)
PLATELET # BLD AUTO: 256 10E3/UL (ref 150–450)
POTASSIUM SERPL-SCNC: 4.3 MMOL/L (ref 3.4–5.3)
RBC # BLD AUTO: 4.96 10E6/UL (ref 3.8–5.2)
SODIUM SERPL-SCNC: 136 MMOL/L (ref 135–145)
TROPONIN T SERPL HS-MCNC: 9 NG/L
TROPONIN T SERPL HS-MCNC: 9 NG/L
WBC # BLD AUTO: 8.8 10E3/UL (ref 4–11)

## 2025-02-24 PROCEDURE — 250N000013 HC RX MED GY IP 250 OP 250 PS 637: Performed by: EMERGENCY MEDICINE

## 2025-02-24 PROCEDURE — 83880 ASSAY OF NATRIURETIC PEPTIDE: CPT | Performed by: EMERGENCY MEDICINE

## 2025-02-24 PROCEDURE — 71046 X-RAY EXAM CHEST 2 VIEWS: CPT

## 2025-02-24 PROCEDURE — 80048 BASIC METABOLIC PNL TOTAL CA: CPT | Performed by: EMERGENCY MEDICINE

## 2025-02-24 PROCEDURE — 36415 COLL VENOUS BLD VENIPUNCTURE: CPT | Performed by: EMERGENCY MEDICINE

## 2025-02-24 PROCEDURE — 99285 EMERGENCY DEPT VISIT HI MDM: CPT | Mod: 25

## 2025-02-24 PROCEDURE — 85025 COMPLETE CBC W/AUTO DIFF WBC: CPT | Performed by: EMERGENCY MEDICINE

## 2025-02-24 PROCEDURE — 93005 ELECTROCARDIOGRAM TRACING: CPT

## 2025-02-24 PROCEDURE — 84484 ASSAY OF TROPONIN QUANT: CPT | Performed by: EMERGENCY MEDICINE

## 2025-02-24 PROCEDURE — 85041 AUTOMATED RBC COUNT: CPT | Performed by: EMERGENCY MEDICINE

## 2025-02-24 PROCEDURE — 72040 X-RAY EXAM NECK SPINE 2-3 VW: CPT

## 2025-02-24 RX ORDER — HYDROCODONE BITARTRATE AND ACETAMINOPHEN 5; 325 MG/1; MG/1
1 TABLET ORAL ONCE
Status: COMPLETED | OUTPATIENT
Start: 2025-02-24 | End: 2025-02-24

## 2025-02-24 RX ORDER — CYCLOBENZAPRINE HCL 10 MG
10 TABLET ORAL 3 TIMES DAILY PRN
Qty: 20 TABLET | Refills: 0 | Status: SHIPPED | OUTPATIENT
Start: 2025-02-24 | End: 2025-03-03

## 2025-02-24 RX ADMIN — HYDROCODONE BITARTRATE AND ACETAMINOPHEN 1 TABLET: 5; 325 TABLET ORAL at 19:10

## 2025-02-24 ASSESSMENT — ACTIVITIES OF DAILY LIVING (ADL)
ADLS_ACUITY_SCORE: 45

## 2025-02-24 ASSESSMENT — COLUMBIA-SUICIDE SEVERITY RATING SCALE - C-SSRS
2. HAVE YOU ACTUALLY HAD ANY THOUGHTS OF KILLING YOURSELF IN THE PAST MONTH?: NO
1. IN THE PAST MONTH, HAVE YOU WISHED YOU WERE DEAD OR WISHED YOU COULD GO TO SLEEP AND NOT WAKE UP?: NO
6. HAVE YOU EVER DONE ANYTHING, STARTED TO DO ANYTHING, OR PREPARED TO DO ANYTHING TO END YOUR LIFE?: NO

## 2025-02-24 NOTE — ED TRIAGE NOTES
Pt c/o neck and back pain for the last couple days- also states she has new swelling to feet within the past x3 weeks- states she's been lightheaded and short of breath. Respirations unlabored and VSS Took tylenol at 1200 pta     Triage Assessment (Adult)       Row Name 02/24/25 8282          Triage Assessment    Airway WDL WDL        Respiratory WDL    Respiratory WDL X  shortness of breath        Cardiac WDL    Cardiac WDL WDL

## 2025-02-25 LAB
ATRIAL RATE - MUSE: 82 BPM
DIASTOLIC BLOOD PRESSURE - MUSE: NORMAL MMHG
INTERPRETATION ECG - MUSE: NORMAL
P AXIS - MUSE: 49 DEGREES
PR INTERVAL - MUSE: 210 MS
QRS DURATION - MUSE: 144 MS
QT - MUSE: 438 MS
QTC - MUSE: 511 MS
R AXIS - MUSE: -45 DEGREES
SYSTOLIC BLOOD PRESSURE - MUSE: NORMAL MMHG
T AXIS - MUSE: 47 DEGREES
VENTRICULAR RATE- MUSE: 82 BPM

## 2025-02-25 NOTE — ED PROVIDER NOTES
"  Emergency Department Note      History of Present Illness     Chief Complaint   Neck Pain and Back Pain      HPI   Michelle Pedersen is a 74 year old female who presents to the ED with intermittent pain in the right side of the neck over the course of the last 2 weeks.  No radiation to the arm.  She says that perhaps she does occasionally have some tingling in her hand although it is not currently present.  She is not aware of any provoking factors for the tingling.  No motor loss.  No symptoms affecting the face or arm.  Her neck pain is worse with movement of her head around.  At times she feels lightheaded but not ataxic or vertiginous.  No recent fever or cough.  No lifting or straining.  No fall or direct trauma.  She has not had similar symptoms in the past.  Over the course of the last few weeks, the patient has become increasingly dyspneic at times with exertion such as walking around a store or going up stairs.  No exertional chest pain.  No syncope.  She has remote history of smoking.  No recent changes to her diet, medication regimen, or any travel or ill contacts.    Independent Historian   The patient's daughter is present provides additional history.    Review of External Notes   Outpatient notes reviewed from earlier today when the patient was seen for neck pain and occasional shortness of breath.  She was referred into the ED.    Past Medical History     Medical History and Problem List   Breast cancer  Depression  High cholesterol  Hypothyroid    Medications   Arimidex  Celexa  Synthroid  Protonix   Zocor   Zinc 23     Surgical History   Colonoscopy  Enteroscopy of the small bowel   Esophagoscopy   Gastroscopy  Duodenoscopy      Physical Exam     Patient Vitals for the past 24 hrs:   BP Temp Pulse Resp SpO2 Height Weight   02/24/25 2103 (!) 154/96 -- 80 -- 100 % -- --   02/24/25 1648 (!) 157/95 97.2  F (36.2  C) 88 20 100 % 1.575 m (5' 2\") 101.9 kg (224 lb 10.4 oz)     Physical Exam  Constitutional:  "      General: She is not in acute distress.     Appearance: Normal appearance. She is not diaphoretic.   HENT:      Head: Atraumatic.      Mouth/Throat:      Mouth: Mucous membranes are moist.   Eyes:      General: No scleral icterus.     Conjunctiva/sclera: Conjunctivae normal.   Neck:      Comments: Mild tenderness over the right posterior cervical paraspinal muscles.  Cardiovascular:      Rate and Rhythm: Normal rate and regular rhythm.      Heart sounds: Normal heart sounds.   Pulmonary:      Effort: No respiratory distress.      Breath sounds: Normal breath sounds.   Abdominal:      General: Abdomen is flat. There is no distension.      Tenderness: There is no abdominal tenderness.   Musculoskeletal:      Cervical back: Neck supple.   Skin:     General: Skin is warm.      Capillary Refill: Capillary refill takes less than 2 seconds.      Findings: No rash.   Neurological:      Mental Status: She is alert.      Comments: Strength and sensation intact in the hands.  5 out of 5 abduction and abduction at the shoulder.  No facial asymmetry.  Speech is fluent.  Gait is normal.   Psychiatric:         Mood and Affect: Mood normal.         Behavior: Behavior normal.           Diagnostics     Lab Results   Labs Ordered and Resulted from Time of ED Arrival to Time of ED Departure   BASIC METABOLIC PANEL - Abnormal       Result Value    Sodium 136      Potassium 4.3      Chloride 101      Carbon Dioxide (CO2) 25      Anion Gap 10      Urea Nitrogen 13.2      Creatinine 0.70      GFR Estimate 89      Calcium 9.4      Glucose 114 (*)    CBC WITH PLATELETS AND DIFFERENTIAL - Abnormal    WBC Count 8.8      RBC Count 4.96      Hemoglobin 14.0      Hematocrit 43.7      MCV 88      MCH 28.2      MCHC 32.0      RDW 15.6 (*)     Platelet Count 256      % Neutrophils 63      % Lymphocytes 28      % Monocytes 6      % Eosinophils 1      % Basophils 1      % Immature Granulocytes 1      NRBCs per 100 WBC 0      Absolute Neutrophils  5.5      Absolute Lymphocytes 2.5      Absolute Monocytes 0.6      Absolute Eosinophils 0.1      Absolute Basophils 0.0      Absolute Immature Granulocytes 0.1      Absolute NRBCs 0.0     TROPONIN T, HIGH SENSITIVITY - Normal    Troponin T, High Sensitivity 9     NT PROBNP INPATIENT - Normal    N terminal Pro BNP Inpatient 216     TROPONIN T, HIGH SENSITIVITY - Normal    Troponin T, High Sensitivity 9         Imaging   Cervical spine XR, 2-3 views   Final Result   IMPRESSION: Lateral alignment is normal. There is straightening of the normal cervical lordosis. Craniocervical junction is intact. Mild degenerative change anterior C1-C2 articulation. Dens appears grossly intact on the open-mouth view. Vertebral body    heights are grossly preserved. Advanced interspace narrowing C6-C7 and C7-T1 with more mild interspace narrowing at the remaining levels. Mild ventral spurring C5-C6 and more caudal levels. Facets are unremarkable. No prevertebral soft tissue swelling.    Visualized lung apices are clear.         XR Chest 2 Views   Final Result   IMPRESSION: Shallow inspiration. Chronically elevated right hemidiaphragm unchanged. Lungs remain expanded and clear. No acute new findings.          EKG   ECG results from 02/24/25   EKG 12 lead     Value    Systolic Blood Pressure     Diastolic Blood Pressure     Ventricular Rate 82    Atrial Rate 82    NJ Interval 210    QRS Duration 144        QTc 511    P Axis 49    R AXIS -45    T Axis 47    Interpretation ECG      Sinus rhythm with 1st degree A-V block  Right bundle branch block  Left anterior fascicular block  ** Bifascicular block **  Minimal voltage criteria for LVH, may be normal variant ( R in aVL )  Abnormal ECG  When compared with ECG of 125-Aug -2019 16:53,  No significant change found     Interpreted by me at 1852         Independent Interpretation   Chest x-ray dependently interpreted.  No pneumothorax.  ED Course      Medications Administered   Medications    HYDROcodone-acetaminophen (NORCO) 5-325 MG per tablet 1 tablet (1 tablet Oral $Given 2/24/25 1910)       ED Course   ED Course as of 02/25/25 0038 Mon Feb 24, 2025 1843 I obtained the history and examined the patient as noted above.      2052 I rechecked and updated the patient.          Medical Decision Making / Diagnosis     MOLLY Pedersen is a 74 year old female who presents to the ED for evaluation of neck pain.  Given her age and risk factors she had cardiac workup.  Fortunately EKG and troponin measurements were not consistent with cardiac ischemia.  She has tenderness in the C-spine area but no neurologic treatment to her pain.    Age and risk factors in the absence of any prior cardiac workup she will be referred to cardiology for consideration of stress testing.  In the meantime she will have Flexeril available for likely musculoskeletal pain.  She declined a referral to the spine clinic and wants to follow through the primary care clinic.    Disposition   The patient was discharged.     Diagnosis     ICD-10-CM    1. Neck pain on right side  M54.2 Follow-Up with Cardiology      2. Cervical spine arthritis  M47.812            Discharge Medications   Discharge Medication List as of 2/24/2025  8:58 PM        START taking these medications    Details   cyclobenzaprine (FLEXERIL) 10 MG tablet Take 1 tablet (10 mg) by mouth 3 times daily as needed., Disp-20 tablet, R-0, E-Prescribe               Scribe Disclosure:  KRISTI, Maren Blancas, am serving as a scribe at 8:59 PM on 2/24/2025 to document services personally performed by Nicholas Newsome MD based on my observations and the provider's statements to me.        Nicholas Newsome MD  02/25/25 0038

## (undated) DEVICE — SYR ENDO MARKER SPOT GI 5ML GIS-45

## (undated) DEVICE — ENDO CAP AND TUBING STERILE FOR ENDOGATOR  100130

## (undated) DEVICE — ENDO TANK RESERVOIR FOR SPLINTING TUBE MAJ-1727

## (undated) DEVICE — ENDO DEVICE LOCKING AND BIOPSY CAP M00545261

## (undated) DEVICE — ENDO BITE BLOCK ADULT OMNI-BLOC

## (undated) DEVICE — ENDO FORCEP ENDOJAW BIOPSY 2.8MMX160CM FB-220K

## (undated) DEVICE — ENDO TUBE SPLINTING ST-SB1

## (undated) DEVICE — KIT ENDO TURNOVER/PROCEDURE W/CLEAN A SCOPE LINERS 103888

## (undated) DEVICE — KIT CONNECTOR FOR OLYMPUS ENDOSCOPES DEFENDO 100310

## (undated) DEVICE — PAD CHUX UNDERPAD 23X24" 7136

## (undated) DEVICE — CLIP HEMOCLIP ENDOSCOPIC INSTINCT 2.8X230CM INSC-7-230-SS

## (undated) DEVICE — ENDO TUBING CO2 SMARTCAP STERILE DISP 100145CO2EXT

## (undated) DEVICE — ENDO FORCEP ENDOJAW BIOPSY 2.8MMX230CM FB-220U

## (undated) DEVICE — NDL SCLEROTHERAPY 25GA CARR-LOCK  00711811

## (undated) DEVICE — SOL WATER IRRIG 1000ML BOTTLE 2F7114

## (undated) DEVICE — PACK ENDOSCOPY GI CUSTOM UMMC

## (undated) DEVICE — KIT ENDO FIRST STEP DISINFECTANT 200ML W/POUCH EP-4

## (undated) RX ORDER — FENTANYL CITRATE 50 UG/ML
INJECTION, SOLUTION INTRAMUSCULAR; INTRAVENOUS
Status: DISPENSED
Start: 2019-08-23

## (undated) RX ORDER — ONDANSETRON 2 MG/ML
INJECTION INTRAMUSCULAR; INTRAVENOUS
Status: DISPENSED
Start: 2019-08-23

## (undated) RX ORDER — FENTANYL CITRATE 50 UG/ML
INJECTION, SOLUTION INTRAMUSCULAR; INTRAVENOUS
Status: DISPENSED
Start: 2018-02-21

## (undated) RX ORDER — SIMETHICONE 40MG/0.6ML
SUSPENSION, DROPS(FINAL DOSAGE FORM)(ML) ORAL
Status: DISPENSED
Start: 2019-08-23

## (undated) RX ORDER — INDOMETHACIN 50 MG/1
SUPPOSITORY RECTAL
Status: DISPENSED
Start: 2019-08-23

## (undated) RX ORDER — ACETAMINOPHEN 325 MG/1
TABLET ORAL
Status: DISPENSED
Start: 2019-08-23

## (undated) RX ORDER — DEXAMETHASONE SODIUM PHOSPHATE 4 MG/ML
INJECTION, SOLUTION INTRA-ARTICULAR; INTRALESIONAL; INTRAMUSCULAR; INTRAVENOUS; SOFT TISSUE
Status: DISPENSED
Start: 2019-08-23

## (undated) RX ORDER — WATER 10 ML/10ML
INJECTION INTRAMUSCULAR; INTRAVENOUS; SUBCUTANEOUS
Status: DISPENSED
Start: 2019-08-23

## (undated) RX ORDER — LIDOCAINE HYDROCHLORIDE 20 MG/ML
INJECTION, SOLUTION EPIDURAL; INFILTRATION; INTRACAUDAL; PERINEURAL
Status: DISPENSED
Start: 2019-08-23

## (undated) RX ORDER — PROPOFOL 10 MG/ML
INJECTION, EMULSION INTRAVENOUS
Status: DISPENSED
Start: 2019-08-23

## (undated) RX ORDER — GLYCOPYRROLATE 0.2 MG/ML
INJECTION, SOLUTION INTRAMUSCULAR; INTRAVENOUS
Status: DISPENSED
Start: 2019-08-23

## (undated) RX ORDER — FENTANYL CITRATE 50 UG/ML
INJECTION, SOLUTION INTRAMUSCULAR; INTRAVENOUS
Status: DISPENSED
Start: 2018-12-14

## (undated) RX ORDER — IOPAMIDOL 510 MG/ML
INJECTION, SOLUTION INTRAVASCULAR
Status: DISPENSED
Start: 2019-08-23

## (undated) RX ORDER — FENTANYL CITRATE 50 UG/ML
INJECTION, SOLUTION INTRAMUSCULAR; INTRAVENOUS
Status: DISPENSED
Start: 2018-02-26